# Patient Record
Sex: FEMALE | Race: WHITE | Employment: UNEMPLOYED | URBAN - METROPOLITAN AREA
[De-identification: names, ages, dates, MRNs, and addresses within clinical notes are randomized per-mention and may not be internally consistent; named-entity substitution may affect disease eponyms.]

---

## 2018-02-09 PROBLEM — F32.A DEPRESSION: Status: ACTIVE | Noted: 2018-02-09

## 2018-03-06 ENCOUNTER — HOSPITAL ENCOUNTER (OUTPATIENT)
Age: 38
Setting detail: SPECIMEN
Discharge: HOME | End: 2018-03-06
Payer: COMMERCIAL

## 2018-03-06 DIAGNOSIS — R51.9 NONINTRACTABLE EPISODIC HEADACHE, UNSPECIFIED HEADACHE TYPE: ICD-10-CM

## 2018-03-06 DIAGNOSIS — Z13.9 SCREENING FOR CONDITION: ICD-10-CM

## 2018-03-06 LAB
ALBUMIN SERPL-MCNC: 4.3 G/DL (ref 3.5–5.2)
ALBUMIN/GLOBULIN RATIO: 1.7 (ref 1–2.5)
ALP BLD-CCNC: 61 U/L (ref 35–104)
ALT SERPL-CCNC: 18 U/L (ref 5–33)
ANION GAP SERPL CALCULATED.3IONS-SCNC: 16 MMOL/L (ref 9–17)
AST SERPL-CCNC: 20 U/L
BILIRUB SERPL-MCNC: 0.15 MG/DL (ref 0.3–1.2)
BUN BLDV-MCNC: 10 MG/DL (ref 6–20)
BUN/CREAT BLD: ABNORMAL (ref 9–20)
CALCIUM SERPL-MCNC: 9.5 MG/DL (ref 8.6–10.4)
CHLORIDE BLD-SCNC: 103 MMOL/L (ref 98–107)
CHOLESTEROL/HDL RATIO: 3.3
CHOLESTEROL: 147 MG/DL
CO2: 23 MMOL/L (ref 20–31)
CREAT SERPL-MCNC: 0.49 MG/DL (ref 0.5–0.9)
GFR AFRICAN AMERICAN: >60 ML/MIN
GFR NON-AFRICAN AMERICAN: >60 ML/MIN
GFR SERPL CREATININE-BSD FRML MDRD: ABNORMAL ML/MIN/{1.73_M2}
GFR SERPL CREATININE-BSD FRML MDRD: ABNORMAL ML/MIN/{1.73_M2}
GLUCOSE BLD-MCNC: 87 MG/DL (ref 70–99)
HCT VFR BLD CALC: 38.9 % (ref 36.3–47.1)
HDLC SERPL-MCNC: 44 MG/DL
HEMOGLOBIN: 12.1 G/DL (ref 11.9–15.1)
IRON: 61 UG/DL (ref 37–145)
LDL CHOLESTEROL: 91 MG/DL (ref 0–130)
MCH RBC QN AUTO: 28.9 PG (ref 25.2–33.5)
MCHC RBC AUTO-ENTMCNC: 31.1 G/DL (ref 28.4–34.8)
MCV RBC AUTO: 93.1 FL (ref 82.6–102.9)
NRBC AUTOMATED: 0 PER 100 WBC
PDW BLD-RTO: 12.1 % (ref 11.8–14.4)
PLATELET # BLD: 291 K/UL (ref 138–453)
PMV BLD AUTO: 11 FL (ref 8.1–13.5)
POTASSIUM SERPL-SCNC: 4.2 MMOL/L (ref 3.7–5.3)
RBC # BLD: 4.18 M/UL (ref 3.95–5.11)
SODIUM BLD-SCNC: 142 MMOL/L (ref 135–144)
THYROXINE, FREE: 0.97 NG/DL (ref 0.93–1.7)
TOTAL PROTEIN: 6.9 G/DL (ref 6.4–8.3)
TRIGL SERPL-MCNC: 59 MG/DL
TSH SERPL DL<=0.05 MIU/L-ACNC: 0.83 MIU/L (ref 0.3–5)
VLDLC SERPL CALC-MCNC: NORMAL MG/DL (ref 1–30)
WBC # BLD: 7.9 K/UL (ref 3.5–11.3)

## 2018-03-20 ENCOUNTER — HOSPITAL ENCOUNTER (OUTPATIENT)
Dept: MRI IMAGING | Facility: CLINIC | Age: 38
Discharge: HOME | End: 2018-03-22
Payer: COMMERCIAL

## 2018-03-20 DIAGNOSIS — R51.9 ACUTE NONINTRACTABLE HEADACHE, UNSPECIFIED HEADACHE TYPE: ICD-10-CM

## 2018-03-20 DIAGNOSIS — R51.9 NONINTRACTABLE EPISODIC HEADACHE, UNSPECIFIED HEADACHE TYPE: ICD-10-CM

## 2018-03-20 PROCEDURE — 2580000003 HC RX 258: Performed by: NURSE PRACTITIONER

## 2018-03-20 PROCEDURE — 70553 MRI BRAIN STEM W/O & W/DYE: CPT

## 2018-03-20 PROCEDURE — 6360000004 HC RX CONTRAST MEDICATION: Performed by: NURSE PRACTITIONER

## 2018-03-20 PROCEDURE — 70544 MR ANGIOGRAPHY HEAD W/O DYE: CPT

## 2018-03-20 PROCEDURE — A9579 GAD-BASE MR CONTRAST NOS,1ML: HCPCS | Performed by: NURSE PRACTITIONER

## 2018-03-20 RX ORDER — SODIUM CHLORIDE 0.9 % (FLUSH) 0.9 %
10 SYRINGE (ML) INJECTION PRN
Status: COMPLETED | OUTPATIENT
Start: 2018-03-20 | End: 2018-03-20

## 2018-03-20 RX ADMIN — GADOTERIDOL 15 ML: 279.3 INJECTION, SOLUTION INTRAVENOUS at 09:52

## 2018-03-20 RX ADMIN — Medication 10 ML: at 09:30

## 2018-03-23 ENCOUNTER — HOSPITAL ENCOUNTER (OUTPATIENT)
Age: 38
Setting detail: SPECIMEN
Discharge: HOME | End: 2018-03-23
Payer: COMMERCIAL

## 2018-03-23 DIAGNOSIS — M25.50 ARTHRALGIA, UNSPECIFIED JOINT: ICD-10-CM

## 2018-03-23 DIAGNOSIS — R53.83 FATIGUE, UNSPECIFIED TYPE: ICD-10-CM

## 2018-03-23 DIAGNOSIS — R51.9 PRESSURE IN HEAD: ICD-10-CM

## 2018-03-23 DIAGNOSIS — E55.9 VITAMIN D DEFICIENCY: ICD-10-CM

## 2018-03-23 LAB
C-REACTIVE PROTEIN: 5.8 MG/L (ref 0–5)
LH: 7 U/L (ref 1–95.6)
RHEUMATOID FACTOR: <10 IU/ML
SEDIMENTATION RATE, ERYTHROCYTE: 29 MM (ref 0–20)
SEX HORMONE BINDING GLOBULIN: 75 NMOL/L (ref 30–135)
TESTOSTERONE FREE-NONMALE: 3.2 PG/ML (ref 1.3–9.2)
TESTOSTERONE TOTAL: 31 NG/DL (ref 20–70)
VITAMIN D 25-HYDROXY: 51.7 NG/ML (ref 30–100)

## 2018-03-26 LAB — ANTI-NUCLEAR ANTIBODY (ANA): NEGATIVE

## 2018-03-27 LAB — CCP IGG ANTIBODIES: <1.5 U/ML

## 2018-04-06 PROBLEM — R76.8 ELEVATED ANTINUCLEAR ANTIBODY (ANA) LEVEL: Status: ACTIVE | Noted: 2018-04-06

## 2018-04-06 PROBLEM — R79.82 ELEVATED C-REACTIVE PROTEIN (CRP): Status: ACTIVE | Noted: 2018-04-06

## 2018-09-24 ENCOUNTER — HOSPITAL ENCOUNTER (OUTPATIENT)
Age: 38
Setting detail: SPECIMEN
Discharge: HOME | End: 2018-09-24
Payer: COMMERCIAL

## 2018-09-24 ENCOUNTER — OFFICE VISIT (OUTPATIENT)
Dept: OBGYN CLINIC | Age: 38
End: 2018-09-24
Payer: COMMERCIAL

## 2018-09-24 DIAGNOSIS — Z01.419 PAP SMEAR, AS PART OF ROUTINE GYNECOLOGICAL EXAMINATION: ICD-10-CM

## 2018-09-24 DIAGNOSIS — Z11.51 SCREENING FOR HUMAN PAPILLOMAVIRUS: ICD-10-CM

## 2018-09-24 DIAGNOSIS — Z12.31 ENCOUNTER FOR SCREENING MAMMOGRAM FOR BREAST CANCER: Primary | ICD-10-CM

## 2018-09-24 PROCEDURE — G0145 SCR C/V CYTO,THINLAYER,RESCR: HCPCS

## 2018-09-24 PROCEDURE — 87624 HPV HI-RISK TYP POOLED RSLT: CPT

## 2018-09-24 PROCEDURE — 99386 PREV VISIT NEW AGE 40-64: CPT | Performed by: ADVANCED PRACTICE MIDWIFE

## 2018-09-24 ASSESSMENT — PATIENT HEALTH QUESTIONNAIRE - PHQ9
SUM OF ALL RESPONSES TO PHQ QUESTIONS 1-9: 0
SUM OF ALL RESPONSES TO PHQ QUESTIONS 1-9: 0
1. LITTLE INTEREST OR PLEASURE IN DOING THINGS: 0
2. FEELING DOWN, DEPRESSED OR HOPELESS: 0
SUM OF ALL RESPONSES TO PHQ9 QUESTIONS 1 & 2: 0

## 2018-09-24 NOTE — PROGRESS NOTES
History and Physical  [address], [address] [facility] [place] [address]. [phone]   Fax [phone]  Angélica Schmidt  9/24/2018              34 y.o. No chief complaint on file. No LMP recorded. Primary Care Physician: BRENDON Duvall - CNP    The patient was seen and examined. She has no chief complaint today and is here for her annual exam.  Her bowels are regular. There are no voiding complaints. She denies any bloating. She denies vaginal discharge and was counseled on STD's and the need for barrier contraception. HPI : Angélica Schmidt is a 34 y.o. female No obstetric history on file.     Gyn exam, no complaints  ________________________________________________________________________  Obstetric History     No data available        Past Medical History:   Diagnosis Date    Abnormal Pap smear of cervix     follows with Linda    Anxiety     Depression     teen    GERD (gastroesophageal reflux disease)                                                                    Past Surgical History:   Procedure Laterality Date    KNEE SURGERY Right     scope bursitis      Family History   Problem Relation Age of Onset    Arthritis Mother     Diabetes Mother     High Cholesterol Mother     Arthritis Father     Diabetes Father     High Cholesterol Father     Arthritis Sister     Arthritis Brother     Arthritis Maternal Aunt     Arthritis Maternal Uncle     Arthritis Paternal Aunt     Arthritis Paternal Uncle     Arthritis Maternal Grandmother     Asthma Maternal Grandmother     Diabetes Maternal Grandmother     Heart Disease Maternal Grandmother         CHF    High Blood Pressure Maternal Grandmother     Anemia Maternal Grandfather     Arthritis Maternal Grandfather     Colon Cancer Maternal Grandfather     Prostate Cancer Maternal Grandfather     Cancer Maternal Grandfather         skin    High Blood Pressure PHYSICAL Exam:     Constitutional:  There were no vitals filed for this visit. General Appearance: This  is a well Developed, well Nourished, well groomed female. Her BMI was reviewed. Nutritional decision making was discussed. Skin:  There was a Normal Inspection of the skin without rashes or lesions. There were no rashes. (Papular, Maculopapular, Hives, Pustular, Macular)     There were no lesions (Ulcers, Erythema, Abn. Appearing Nevi)            Lymphatic:  No Lymph Nodes were Palpable in the neck , axilla or groin. # Of Lymph Nodes; Location ; Character [Normal]  [Shotty] [Tender] [Enlarged]     Neck and EENT:  The neck was supple. There were no masses   The thyroid was not enlarged and had no masses. Perrla, EOMI B/L, TMI B/L No Abnormalities. Throat inspected-No exudates or Masses, Nares Patent No Masses        Respiratory: The lungs were auscultated and found to be clear. There were no rales, rhonchi or wheezes. There was a good respiratory effort. Cardiovascular: The heart was in a regular rate and rhythm. . No S3 or S4. There was no murmur appreciated. Location, grade, and radiation are not applicable. Extremities: The patients extremities were without calf tenderness, edema, or varicosities. There was full range of motion in all four extremities. Pulses in all four extremities were appreciated and are 2/4. Abdomen: The abdomen was soft and non-tender. There were good bowel sounds in all quadrants and there was no guarding, rebound or rigidity. On evaluation there was no evidence of hepatosplenomegaly and there was no costal vertebral taylor tenderness bilaterally. No hernias were appreciated. Abdominal Scars:     Psych:   The patient had a normal Orientation to: Time, Place, every 2-3 years. Begin at 73 yo. If no fracture history or osteoporosis family history. (significant). 4. Colonoscopy begin at 38 yo. Repeat every ten years if negative and no family history. 5. Calcium of 7607-1136 mg/day in split dosing  6. Vitamin D 400-800 IU/day  7. All other preventative health recommendations will be managed by the patients Primary care physician. PLAN:  Return for Annual.   Pt has history of Grandfather with colon cancer FIT test given  Mammogram ordered  PAP collected  Repeat Annual every 1 year  Cervical Cytology Evaluation begins at 24years old. If Negative Cytology, Follow-up screening per current guidelines. Mammograms every 1 year. If 35 yo and last mammogram was negative. Calcium and Vitamin D dosing reviewed. Colonoscopy screening reviewed as well as onset for bone density testing. Birth control and barrier recommendations discussed. STD counseling and prevention reviewed. Gardisil counseling completed for all patients 7-33 yo. Routine health maintenance per patients PCP.   Orders Placed This Encounter   Procedures    BULL DIGITAL DIAGNOSTIC W OR WO CAD BILATERAL     Standing Status:   Future     Standing Expiration Date:   11/24/2019

## 2018-09-25 DIAGNOSIS — Z12.31 SCREENING MAMMOGRAM, ENCOUNTER FOR: Primary | ICD-10-CM

## 2018-09-26 LAB
HPV SAMPLE: NORMAL
HPV SOURCE: NORMAL
HPV, GENOTYPE 16: NOT DETECTED
HPV, GENOTYPE 18: NOT DETECTED
HPV, HIGH RISK OTHER: NOT DETECTED
HPV, INTERPRETATION: NORMAL

## 2018-10-08 ENCOUNTER — HOSPITAL ENCOUNTER (OUTPATIENT)
Dept: WOMENS IMAGING | Age: 38
Discharge: HOME | End: 2018-10-10
Payer: COMMERCIAL

## 2018-10-08 DIAGNOSIS — Z12.31 ENCOUNTER FOR SCREENING MAMMOGRAM FOR BREAST CANCER: ICD-10-CM

## 2018-10-08 DIAGNOSIS — N64.4 BREAST PAIN: Primary | ICD-10-CM

## 2018-10-08 DIAGNOSIS — R52 PAIN: ICD-10-CM

## 2018-10-08 DIAGNOSIS — N64.4 BREAST PAIN: ICD-10-CM

## 2018-10-08 DIAGNOSIS — N64.4 BREAST TENDERNESS IN FEMALE: ICD-10-CM

## 2018-10-08 LAB — CYTOLOGY REPORT: NORMAL

## 2018-10-08 PROCEDURE — G0279 TOMOSYNTHESIS, MAMMO: HCPCS

## 2018-10-08 PROCEDURE — 76642 ULTRASOUND BREAST LIMITED: CPT

## 2019-10-17 ENCOUNTER — TELEPHONE (OUTPATIENT)
Dept: OBGYN CLINIC | Age: 39
End: 2019-10-17

## 2019-10-28 ENCOUNTER — HOSPITAL ENCOUNTER (OUTPATIENT)
Dept: WOMENS IMAGING | Age: 39
Discharge: HOME | End: 2019-10-30
Payer: COMMERCIAL

## 2019-10-28 DIAGNOSIS — Z12.31 BREAST CANCER SCREENING BY MAMMOGRAM: ICD-10-CM

## 2019-10-28 PROCEDURE — 77063 BREAST TOMOSYNTHESIS BI: CPT

## 2019-12-04 ENCOUNTER — HOSPITAL ENCOUNTER (OUTPATIENT)
Age: 39
Setting detail: SPECIMEN
Discharge: HOME | End: 2019-12-04
Payer: COMMERCIAL

## 2019-12-04 ENCOUNTER — OFFICE VISIT (OUTPATIENT)
Dept: OBGYN CLINIC | Age: 39
End: 2019-12-04
Payer: COMMERCIAL

## 2019-12-04 VITALS
HEART RATE: 67 BPM | HEIGHT: 62 IN | SYSTOLIC BLOOD PRESSURE: 118 MMHG | WEIGHT: 187 LBS | DIASTOLIC BLOOD PRESSURE: 80 MMHG | BODY MASS INDEX: 34.41 KG/M2

## 2019-12-04 DIAGNOSIS — Z01.419 WELL WOMAN EXAM WITH ROUTINE GYNECOLOGICAL EXAM: ICD-10-CM

## 2019-12-04 DIAGNOSIS — Z11.51 SCREENING FOR HPV (HUMAN PAPILLOMAVIRUS): ICD-10-CM

## 2019-12-04 DIAGNOSIS — Z01.419 WELL WOMAN EXAM WITH ROUTINE GYNECOLOGICAL EXAM: Primary | ICD-10-CM

## 2019-12-04 PROCEDURE — 87624 HPV HI-RISK TYP POOLED RSLT: CPT

## 2019-12-04 PROCEDURE — G0145 SCR C/V CYTO,THINLAYER,RESCR: HCPCS

## 2019-12-04 PROCEDURE — 99396 PREV VISIT EST AGE 40-64: CPT | Performed by: NURSE PRACTITIONER

## 2019-12-04 ASSESSMENT — PATIENT HEALTH QUESTIONNAIRE - PHQ9
SUM OF ALL RESPONSES TO PHQ9 QUESTIONS 1 & 2: 0
2. FEELING DOWN, DEPRESSED OR HOPELESS: 0
SUM OF ALL RESPONSES TO PHQ QUESTIONS 1-9: 0
SUM OF ALL RESPONSES TO PHQ QUESTIONS 1-9: 0
1. LITTLE INTEREST OR PLEASURE IN DOING THINGS: 0

## 2019-12-06 LAB
HPV SAMPLE: ABNORMAL
HPV, GENOTYPE 16: NOT DETECTED
HPV, GENOTYPE 18: NOT DETECTED
HPV, HIGH RISK OTHER: DETECTED
HPV, INTERPRETATION: ABNORMAL
SPECIMEN DESCRIPTION: ABNORMAL

## 2019-12-10 LAB — CYTOLOGY REPORT: NORMAL

## 2019-12-11 ENCOUNTER — TELEPHONE (OUTPATIENT)
Dept: OBGYN CLINIC | Age: 39
End: 2019-12-11

## 2020-02-04 PROBLEM — B97.7 HPV IN FEMALE: Status: ACTIVE | Noted: 2020-02-04

## 2020-02-26 ENCOUNTER — PROCEDURE VISIT (OUTPATIENT)
Dept: OBGYN CLINIC | Age: 40
End: 2020-02-26
Payer: COMMERCIAL

## 2020-02-26 ENCOUNTER — HOSPITAL ENCOUNTER (OUTPATIENT)
Age: 40
Setting detail: SPECIMEN
Discharge: HOME | End: 2020-02-26
Payer: COMMERCIAL

## 2020-02-26 VITALS
WEIGHT: 181 LBS | BODY MASS INDEX: 33.31 KG/M2 | SYSTOLIC BLOOD PRESSURE: 114 MMHG | HEIGHT: 62 IN | HEART RATE: 78 BPM | DIASTOLIC BLOOD PRESSURE: 80 MMHG

## 2020-02-26 LAB
CONTROL: NORMAL
PREGNANCY TEST URINE, POC: NEGATIVE

## 2020-02-26 PROCEDURE — 81025 URINE PREGNANCY TEST: CPT | Performed by: OBSTETRICS & GYNECOLOGY

## 2020-02-26 PROCEDURE — 88305 TISSUE EXAM BY PATHOLOGIST: CPT

## 2020-02-26 PROCEDURE — 57456 ENDOCERV CURETTAGE W/SCOPE: CPT | Performed by: OBSTETRICS & GYNECOLOGY

## 2020-02-26 NOTE — PROGRESS NOTES
[id number] [facility]    COLPOSCOPY PROCEDURE FORM    Chief Complaint:   Chief Complaint   Patient presents with    Procedure         2020  Paul Barber  Patient's last menstrual period was 2020 (approximate).   37 y.o.   Vag x 2    Past Medical History:   Diagnosis Date    Abnormal Pap smear of cervix     follows with Linda    Anxiety     Depression     teen    GERD (gastroesophageal reflux disease)          Past Surgical History:   Procedure Laterality Date    KNEE SURGERY Right     scope bursitis        Family History   Problem Relation Age of Onset    Arthritis Mother     Diabetes Mother     High Cholesterol Mother     Arthritis Father     Diabetes Father     High Cholesterol Father     Arthritis Sister     Arthritis Brother     Arthritis Maternal Aunt     Arthritis Maternal Uncle     Arthritis Paternal Aunt     Arthritis Paternal Uncle     Arthritis Maternal Grandmother     Asthma Maternal Grandmother     Diabetes Maternal Grandmother     Heart Disease Maternal Grandmother         CHF    High Blood Pressure Maternal Grandmother     Anemia Maternal Grandfather     Arthritis Maternal Grandfather     Colon Cancer Maternal Grandfather     Prostate Cancer Maternal Grandfather     Cancer Maternal Grandfather         skin    High Blood Pressure Maternal Grandfather     Arthritis Paternal Grandmother     Cancer Paternal Grandmother         stomach    Stroke Paternal Grandmother     Asthma Maternal Cousin        Social History     Socioeconomic History    Marital status:      Spouse name: Not on file    Number of children: Not on file    Years of education: Not on file    Highest education level: Not on file   Occupational History    Not on file   Social Needs    Financial resource strain: Not on file    Food insecurity:     Worry: Not on file     Inability: Not on file    Transportation needs:     Medical: Not on file Non-medical: Not on file   Tobacco Use    Smoking status: Never Smoker    Smokeless tobacco: Never Used   Substance and Sexual Activity    Alcohol use: Yes     Comment: occiasionally     Drug use: Not on file    Sexual activity: Not on file   Lifestyle    Physical activity:     Days per week: Not on file     Minutes per session: Not on file    Stress: Not on file   Relationships    Social connections:     Talks on phone: Not on file     Gets together: Not on file     Attends [employer] service: Not on file     Active member of club or organization: Not on file     Attends meetings of clubs or organizations: Not on file     Relationship status: Not on file    Intimate partner violence:     Fear of current or ex partner: Not on file     Emotionally abused: Not on file     Physically abused: Not on file     Forced sexual activity: Not on file   Other Topics Concern    Not on file   Social History Narrative    Not on file       Current Outpatient Medications on File Prior to Visit   Medication Sig Dispense Refill    ibuprofen (ADVIL;MOTRIN) 600 MG tablet TAKE ONE TABLET BY MOUTH EVERY 8 HOURS AS NEEDED FOR PAIN OR FEVER 30 tablet 0    Elastic Bandages & Supports (ACE TENNIS ELBOW BRACE) MISC 1 Device by Does not apply route continuous 1 each 0    triamcinolone (NASACORT) 55 MCG/ACT nasal inhaler 2 sprays by Nasal route daily 1 Inhaler 0    lansoprazole (PREVACID) 30 MG delayed release capsule Take 1 capsule by mouth daily 30 capsule 1    albuterol sulfate HFA (PROAIR HFA) 108 (90 Base) MCG/ACT inhaler Inhale 2 puffs into the lungs every 6 hours as needed for Wheezing 1 Inhaler 3    ondansetron (ZOFRAN) 4 MG tablet Take 1 tablet by mouth every 8 hours as needed for Nausea or Vomiting (Patient not taking: Reported on 2/26/2020) 20 tablet 0     No current facility-administered medications on file prior to visit.         Allergies as of 02/26/2020    (No Known Allergies)           INDICATIONS:   1. HPV in female    2. Negative pregnancy test    3. 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 Cervical stenosis (uterine cervix)      Cytology Report 12/04/2019  2:35  [address], [facility] [address]. [place], [address]   [phone]   Fax: [phone]   GYNECOLOGIC CYTOLOGY REPORT     Patient Name: Jacinta Landrum   MR#: [id number]   Specimen #[id number]   Source:   1: Cervical material, (ThinPrep vial, Imaging-assisted review)     Clinical History   Z01.419 Routine gyn exam without abnormal findings   Z11.51 Encounter for screening for HPV   Co-Test:  ThinPrep Pap with high risk HPV testing     INTERPRETATION     Cervical material, (ThinPrep vial, Imaging-assisted review):   Specimen Adequacy:        Satisfactory for evaluation.        -Endocervical/transformation zone component is absent. Descriptive Diagnosis:        Negative for intraepithelial lesion or malignancy.    Comments:        High Risk HPV testing was ordered. Cytotechnologist:   ALLAN Che JD(ASCP)   **Electronically Signed Out**   georgette/12/10/2019           Procedure/Addendum   HPV Procedure Report     Date Ordered:     12/6/2019     Status:   Signed Out        Date Complete:     12/6/2019     By: ALLAN Reynoso(ASCP)        Date Reported:     12/10/2019       INTERPRETATION   Roche HPV DNA High Risk                                                         HPV Sample               Thin Prep                    (Ref Range)   HPV Type 16               Not Detected                    (Not   Detected)   HPV Type 18               Not Detected                    (Not   Detected)   Other High Risk HPV     Detected                    (Not Detected)        This test amplifies and detects DNA of 14 high-risk HPV types   associated with cervical cancer and its precursor lesions (HPV types   16, 18, 31, 33, 35, 39, 45, 51, 52, 56, 58, 59, 66, and 76).   Sensitivity may be affected by specimen collection methods, stage of   infection, and the presence of interfering substances.  Results should   be interpreted in conjunction with other available laboratory and   clinical data.  A negative high-risk HPV result does not exclude the   possibility of future cytologic HSIL or underlying CIN2-3 or cancer. This test is intended for medical purposes only and is not valid for   the evaluation of suspected sexual abuse or for other forensic   purposes. [facility] negative         HPV:   +other      Birth Control Method: TL  Abnormal Cytology and Colposcopy History: YES ? Colposcopy hx NO LEEP or CKC    COLPOSCOPIC EXAMINATION:                Blood pressure 114/80, pulse 78, height 5' 2\" (1.575 m), weight 181 lb (82.1 kg), last menstrual period 02/04/2020, not currently breastfeeding. Gross observations: negative  Satisfactory: No   Unsatisfactory: Yes                LANDMARKS and ATYPICAL FINDINGS:  TZ = transformation zone   SC = new squamocolumnar junction  NC = Nabothian cyst    ME = immature squamous metaplasia  PO = polyp   AV = atypical vessels  C = condyloma  L = leukoplakia  AW = acetowhite epithelium   P = punctation  MO = mosaicism   LS = decreased Lugols uptake  X = biopsy sites  CA = invasive carcinoma      FINDINGS: The colposcope was utilized on high and low magnification. A plain white light and green filter were  also implemented after 3% of acetic acid was applied to the cervix. There was no Aceto White Epithelium, Mosaic Changes, Punctation, or Irregular Vessels seen. ECC performed:  Yes    Lugols Iodine Applied:   No       IMPRESSIONS: Negative  Biopsy sites: ECC      Assessment:   Diagnosis Orders   1. HPV in female  [place] COLPOSCOPY,CERVIX W/ADJ VAGINA, CURETTAG    Specimen to Pathology Outpatient   2. Negative pregnancy test  POCT urine pregnancy   3.  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 Cervical stenosis (uterine cervix)

## 2020-02-28 LAB — SURGICAL PATHOLOGY REPORT: NORMAL

## 2020-10-06 ENCOUNTER — OFFICE VISIT (OUTPATIENT)
Dept: OBGYN CLINIC | Age: 40
End: 2020-10-06
Payer: COMMERCIAL

## 2020-10-06 ENCOUNTER — HOSPITAL ENCOUNTER (OUTPATIENT)
Age: 40
Setting detail: SPECIMEN
Discharge: HOME | End: 2020-10-06
Payer: COMMERCIAL

## 2020-10-06 VITALS
TEMPERATURE: 98.2 F | WEIGHT: 181 LBS | DIASTOLIC BLOOD PRESSURE: 70 MMHG | SYSTOLIC BLOOD PRESSURE: 114 MMHG | HEART RATE: 78 BPM | BODY MASS INDEX: 33.31 KG/M2 | HEIGHT: 62 IN

## 2020-10-06 PROCEDURE — G8484 FLU IMMUNIZE NO ADMIN: HCPCS | Performed by: OBSTETRICS & GYNECOLOGY

## 2020-10-06 PROCEDURE — G8417 CALC BMI ABV UP PARAM F/U: HCPCS | Performed by: OBSTETRICS & GYNECOLOGY

## 2020-10-06 PROCEDURE — 99213 OFFICE O/P EST LOW 20 MIN: CPT | Performed by: OBSTETRICS & GYNECOLOGY

## 2020-10-06 PROCEDURE — G8427 DOCREV CUR MEDS BY ELIG CLIN: HCPCS | Performed by: OBSTETRICS & GYNECOLOGY

## 2020-10-06 PROCEDURE — 88175 CYTOPATH C/V AUTO FLUID REDO: CPT

## 2020-10-06 PROCEDURE — 1036F TOBACCO NON-USER: CPT | Performed by: OBSTETRICS & GYNECOLOGY

## 2020-10-06 PROCEDURE — 87624 HPV HI-RISK TYP POOLED RSLT: CPT

## 2020-10-06 NOTE — PROGRESS NOTES
Jayesh Hilton  10/6/2020      Jayesh Hilton is a 41 y.o. female No obstetric history on file. The patient was seen today. She was here to follow-up regarding her labs and diagnostics ordered at her last visit for the diagnosis of:    ICD-10-CM    1. 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 Her bowels are regular and she is voiding without difficulty.        Past Medical History:   Diagnosis Date    Abnormal Pap smear of cervix     follows with Linda    Anxiety     Depression     teen    GERD (gastroesophageal reflux disease)          Past Surgical History:   Procedure Laterality Date    KNEE SURGERY Right     scope bursitis          Family History   Problem Relation Age of Onset    Arthritis Mother     Diabetes Mother     High Cholesterol Mother     Arthritis Father     Diabetes Father     High Cholesterol Father     Arthritis Sister     Arthritis Brother     Arthritis Maternal Aunt     Arthritis Maternal Uncle     Arthritis Paternal Aunt     Arthritis Paternal Uncle     Arthritis Maternal Grandmother     Asthma Maternal Grandmother     Diabetes Maternal Grandmother     Heart Disease Maternal Grandmother         CHF    High Blood Pressure Maternal Grandmother     Anemia Maternal Grandfather     Arthritis Maternal Grandfather     Colon Cancer Maternal Grandfather     Prostate Cancer Maternal Grandfather     Cancer Maternal Grandfather         skin    High Blood Pressure Maternal Grandfather     Arthritis Paternal Grandmother     Cancer Paternal Grandmother         stomach    Stroke Paternal Grandmother     Asthma Maternal Cousin          Social History     Tobacco Use    Smoking status: Never Smoker    Smokeless tobacco: Never Used   Substance Use Topics    Alcohol use: Yes     Comment: occiasionally     Drug use: Not on file         MEDICATIONS:  Current Outpatient Medications   Medication Sig Dispense Refill    ibuprofen (ADVIL;MOTRIN) 600 MG tablet TAKE ONE TABLET BY MOUTH EVERY 8 HOURS AS NEEDED FOR PAIN OR FEVER 30 tablet 0    Elastic Bandages & Supports (ACE TENNIS ELBOW BRACE) MISC 1 Device by Does not apply route continuous 1 each 0    triamcinolone (NASACORT) 55 MCG/ACT nasal inhaler 2 sprays by Nasal route daily 1 Inhaler 0    lansoprazole (PREVACID) 30 MG delayed release capsule Take 1 capsule by mouth daily 30 capsule 1    albuterol sulfate HFA (PROAIR HFA) 108 (90 Base) MCG/ACT inhaler Inhale 2 puffs into the lungs every 6 hours as needed for Wheezing 1 Inhaler 3     No current facility-administered medications for this visit. ALLERGIES:  Allergies as of 10/06/2020    (No Known Allergies)         Blood pressure 114/70, pulse 78, temperature 98.2 °F (36.8 °C), height 5' 2\" (1.575 m), weight 181 lb (82.1 kg), last menstrual period 09/05/2020, not currently breastfeeding. Abdomen: Soft non-tender; good bowel sounds. No guarding, rebound or rigidity. No CVA tenderness bilaterally. Extremities: No calf tenderness, DTR 2/4, and No edema bilaterally    Pelvic: Declined         Diagnostics:  No results found. Lab Results:  Results for orders placed or performed during the hospital encounter of 02/26/20   Surgical Pathology   Result Value Ref Range    Surgical Pathology Report       [address]  [phone]  Fax: [phone]  SURGICAL PATHOLOGY REPORT    Patient Name: Christopher Penny  MR#: [id number]  Specimen #[id number]       Final Diagnosis  Endocervix, curettings:       - Benign ectocervical mucosa with no evidence of dysplasia. Deon Hernandez M.D.  **Electronically Signed Out**         [facility]/2/28/2020    Clinical Information  Positive HPV    Source:  A: Endocervical bx (\"ECC\" on container)    Gross Description  The container is labeled \"ECC\".   Received in formalin are multiple  tan-brown fragments of tissue measuring 0.8 cm in greatest aggregate  dimension. The specimen is entirely submitted in one cassette. Microscopic Description  Two H&E slides reviewed. Microscopic examination performed and  supports the diagnostic impression. Assessment:   Diagnosis Orders   1. 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  Pap collected. If negative fu with annual exams and follow ASCCP recommendations  Return to the office in [facility]  STD counseling completed. Counseled on preventative health maintenance follow-up. Orders Placed This Encounter   Procedures    PAP SMEAR     Patient History:    Patient's last menstrual period was 09/05/2020. OBGYN Status: Having periods  Past Surgical History:  No date: KNEE SURGERY; Right      Comment:  scope bursitis     Problem List       Edg Problems Affecting Cytology    HPV in female    Overview Addendum 10/6/2020  3:00 PM by Carlie Henriquez DO     12/4/19 pap collected- negative with positive HR HPV OTHER (Negative   16,18)  2/26/2020 colposcopy with ECC performed (ECC is negative)  8/2020 collect pap-NS/Reschedule  10/6/2020 pap collected with co-test        Social History    Tobacco Use      Smoking status: Never Smoker      Smokeless tobacco: Never Used       Standing Status:   Future     Standing Expiration Date:   10/6/2021     Order Specific Question:   Collection Type     Answer:    Thin Prep     Order Specific Question:   Prior Abnormal Pap Test     Answer:   No     Order Specific Question:   Screening or Diagnostic     Answer:   Diagnostic     Order Specific Question:   HPV Requested? Answer:   Yes     Order Specific Question:   High Risk Patient     Answer:   N/A       Patient was seen with total face to face time of 15 minutes. More than 50% of this visit was counseling and education regarding The encounter diagnosis was HPV in female (OTHER). and Follow-up   as well as  counseling on preventative health maintenance follow-up.

## 2020-10-13 LAB
HPV SOURCE: NORMAL
HPV, GENOTYPE 16: NOT DETECTED
HPV, GENOTYPE 18: NOT DETECTED
HPV, HIGH RISK OTHER: NOT DETECTED

## 2020-10-14 LAB — CYTOLOGY REPORT: NORMAL

## 2020-11-11 ENCOUNTER — HOSPITAL ENCOUNTER (OUTPATIENT)
Age: 40
Setting detail: SPECIMEN
Discharge: HOME | End: 2020-11-11
Payer: COMMERCIAL

## 2020-11-11 ENCOUNTER — NURSE ONLY (OUTPATIENT)
Dept: PRIMARY CARE CLINIC | Age: 40
End: 2020-11-11

## 2020-11-16 LAB — SARS-COV-2, NAA: NOT DETECTED

## 2020-12-03 ENCOUNTER — HOSPITAL ENCOUNTER (OUTPATIENT)
Dept: WOMENS IMAGING | Age: 40
Discharge: HOME | End: 2020-12-05
Payer: COMMERCIAL

## 2020-12-03 PROCEDURE — 77063 BREAST TOMOSYNTHESIS BI: CPT

## 2020-12-14 ENCOUNTER — HOSPITAL ENCOUNTER (OUTPATIENT)
Age: 40
Discharge: HOME | End: 2020-12-14
Payer: COMMERCIAL

## 2020-12-14 PROCEDURE — 83014 H PYLORI DRUG ADMIN: CPT

## 2020-12-14 PROCEDURE — 83013 H PYLORI (C-13) BREATH: CPT

## 2020-12-16 LAB — H PYLORI BREATH TEST: NEGATIVE

## 2021-01-08 ENCOUNTER — TELEPHONE (OUTPATIENT)
Dept: GASTROENTEROLOGY | Age: 41
End: 2021-01-08

## 2021-01-08 NOTE — TELEPHONE ENCOUNTER
Patient called office to see if we had any sooner appointments at this time. Advised after checking all locations that we do not. Updating cancellation list and patient can check back as well. Writer thanked and call ended.

## 2021-01-12 ENCOUNTER — TELEPHONE (OUTPATIENT)
Dept: GASTROENTEROLOGY | Age: 41
End: 2021-01-12

## 2021-01-12 NOTE — TELEPHONE ENCOUNTER
Spoke with pt again and she still wants to wait for Dr Nanette Campoverde. Writer made sure she is on wait list and if possible will get her in next week.

## 2021-01-12 NOTE — TELEPHONE ENCOUNTER
Pt has np set for week of the 25  , but her symptoms are getting worse. Unable to keep any food down from lunch on. Pt does try to eat at nite and drink water and everything comes back up. Pt has been experiencing  Lower back pain. Pt has lost a total of 11 lbs in the last 1-2 weeks. Pt does not want to reschedule with any other dr, but wonders if there is anything you can recommend she do until her appt.    Pt would like to speak with nurse

## 2021-01-12 NOTE — TELEPHONE ENCOUNTER
As this is a new patient, she will need to follow up with her PCP until she is seen in the office for any questions or concerns. Pt can schedule with another GI in office if she is able to get sooner appointment and/or also, please add pt to the cancellation list.  Nurse unable to give medical advise to a patient who has not been seen in our practice. Thank you.

## 2021-01-21 ENCOUNTER — OFFICE VISIT (OUTPATIENT)
Dept: GASTROENTEROLOGY | Age: 41
End: 2021-01-21
Payer: COMMERCIAL

## 2021-01-21 VITALS — SYSTOLIC BLOOD PRESSURE: 129 MMHG | DIASTOLIC BLOOD PRESSURE: 87 MMHG | WEIGHT: 176.9 LBS | BODY MASS INDEX: 32.36 KG/M2

## 2021-01-21 DIAGNOSIS — R11.14 BILIOUS VOMITING WITH NAUSEA: Primary | ICD-10-CM

## 2021-01-21 DIAGNOSIS — K21.9 GASTROESOPHAGEAL REFLUX DISEASE, UNSPECIFIED WHETHER ESOPHAGITIS PRESENT: ICD-10-CM

## 2021-01-21 DIAGNOSIS — Z80.0 FAMILY HISTORY OF COLON CANCER: ICD-10-CM

## 2021-01-21 DIAGNOSIS — R63.4 WEIGHT LOSS: ICD-10-CM

## 2021-01-21 DIAGNOSIS — R13.19 ESOPHAGEAL DYSPHAGIA: ICD-10-CM

## 2021-01-21 PROCEDURE — 99204 OFFICE O/P NEW MOD 45 MIN: CPT | Performed by: INTERNAL MEDICINE

## 2021-01-21 RX ORDER — SUCRALFATE 1 G/1
1 TABLET ORAL 4 TIMES DAILY
COMMUNITY
Start: 2021-01-14 | End: 2021-01-29

## 2021-01-21 RX ORDER — POLYETHYLENE GLYCOL 3350 17 G/17G
POWDER, FOR SOLUTION ORAL
Qty: 238 G | Refills: 0 | Status: SHIPPED | OUTPATIENT
Start: 2021-01-21 | End: 2021-02-18

## 2021-01-21 ASSESSMENT — ENCOUNTER SYMPTOMS
SORE THROAT: 0
RECTAL PAIN: 0
CONSTIPATION: 0
VOMITING: 1
ABDOMINAL PAIN: 1
TROUBLE SWALLOWING: 1
COUGH: 0
DIARRHEA: 0
NAUSEA: 1
ABDOMINAL DISTENTION: 1
BLOOD IN STOOL: 0
VOICE CHANGE: 1
CHOKING: 1
ANAL BLEEDING: 0

## 2021-01-21 NOTE — PROGRESS NOTES
GI NEW PATIENT OFFICE VISIT    INTERVAL HISTORY:   Keven Juarez, APRN - CNP  [facility]  [address] [facility],[address]  [place],  [address] [place] [address]    Chief Complaint   Patient presents with    Emesis     NP with every meal, she states hx of GERD, has lost 15lbs in 9 days    Weight Loss       1. Bilious vomiting with nausea    2. Family history of colon cancer    3. Gastroesophageal reflux disease, unspecified whether esophagitis present    4. Weight loss    5. Esophageal dysphagia        This patient is evaluated in my office for the first time  She was accompanied by her  during the interview  Patient has history significant for chronic acid reflux indigestion    She has been on proton pump inhibitor therapy lansoprazole for a long time  It appears that this medicine stopped working and she thinks that she got immune to that? Subsequently she has been on Pepcid for little bit    For the past 1 year with gradual worsening she is having some vomiting/regurgitation of the food with occasional nausea    She also has lost approximately 15 pounds she claims    She has some trouble swallowing food both with liquids and solids she claims    Denies any hematemesis coffee-ground emesis    Denies any rectal bleeding melanotic stools she has significant family stay for colon cancer      Never had GI work-up done in the past    Patient does have significant anxiety issues she claims    Denies smoking alcohol abuse illicit drug usage    HISTORY OF PRESENT ILLNESS: George Rubi is a 41 y.o. female with a past history remarkable for , referred for evaluation of   Chief Complaint   Patient presents with    Emesis     NP with every meal, she states hx of GERD, has lost 15lbs in 9 days    Weight Loss   . Past Medical,Family, and Social History reviewed and does contribute to the patient presenting condition.     Patient's PMH/PSH,SH,PSYCH Hx, MEDs, ALLERGIES, and ROS were all reviewed and updated in the appropriate sections.     PAST MEDICAL HISTORY:  Past Medical History:   Diagnosis Date    Abnormal Pap smear of cervix     follows with Linda    Anxiety     Depression     teen    GERD (gastroesophageal reflux disease)     Nausea and vomiting        Past Surgical History:   Procedure Laterality Date    KNEE SURGERY Right     scope bursitis        CURRENT MEDICATIONS:    Current Outpatient Medications:     sucralfate (CARAFATE) 1 GM tablet, Take 1 g by mouth 4 times daily, Disp: , Rfl:     famotidine (PEPCID) 20 MG tablet, Take 1 tablet by mouth 2 times daily, Disp: 60 tablet, Rfl: 3    cetirizine (ZYRTEC) 10 MG tablet, TAKE ONE TABLET BY MOUTH DAILY, Disp: 30 tablet, Rfl: 5    ibuprofen (ADVIL;MOTRIN) 600 MG tablet, TAKE ONE TABLET BY MOUTH EVERY 8 HOURS AS NEEDED FOR PAIN OR FEVER, Disp: 30 tablet, Rfl: 0    Elastic Bandages & Supports (ACE TENNIS ELBOW BRACE) MISC, 1 Device by Does not apply route continuous, Disp: 1 each, Rfl: 0    triamcinolone (NASACORT) 55 MCG/ACT nasal inhaler, 2 sprays by Nasal route daily, Disp: 1 Inhaler, Rfl: 0    albuterol sulfate HFA (PROAIR HFA) 108 (90 Base) MCG/ACT inhaler, Inhale 2 puffs into the lungs every 6 hours as needed for Wheezing, Disp: 1 Inhaler, Rfl: 3    ALLERGIES:   No Known Allergies    FAMILY HISTORY:       Problem Relation Age of Onset    Arthritis Mother     Diabetes Mother     High Cholesterol Mother     Arthritis Father     Diabetes Father     High Cholesterol Father     Arthritis Sister     Arthritis Brother     Arthritis Maternal Aunt     Arthritis Maternal Uncle     Arthritis Paternal Aunt     Arthritis Paternal Uncle     Arthritis Maternal Grandmother     Asthma Maternal Grandmother     Diabetes Maternal Grandmother     Heart Disease Maternal Grandmother         CHF    High Blood Pressure Maternal Grandmother     Anemia Maternal Grandfather     Arthritis Maternal Grandfather     Colon Cancer Maternal Grandfather     Prostate Cancer Maternal Grandfather     Cancer Maternal Grandfather         skin    High Blood Pressure Maternal Grandfather     Arthritis Paternal Grandmother     Cancer Paternal Grandmother         stomach    Stroke Paternal Grandmother     Asthma Maternal Cousin          SOCIAL HISTORY:   Social History     Socioeconomic History    Marital status:      Spouse name: Not on file    Number of children: Not on file    Years of education: Not on file    Highest education level: Not on file   Occupational History    Not on file   Social Needs    Financial resource strain: Not on file    Food insecurity     Worry: Not on file     Inability: Not on file    Transportation needs     Medical: Not on file     Non-medical: Not on file   Tobacco Use    Smoking status: Never Smoker    Smokeless tobacco: Never Used   Substance and Sexual Activity    Alcohol use: Yes     Comment: occiasionally     Drug use: Not on file    Sexual activity: Not on file   Lifestyle    Physical activity     Days per week: Not on file     Minutes per session: Not on file    Stress: Not on file   Relationships    Social connections     Talks on phone: Not on file     Gets together: Not on file     Attends Judaism service: Not on file     Active member of club or organization: Not on file     Attends meetings of clubs or organizations: Not on file     Relationship status: Not on file    Intimate partner violence     Fear of current or ex partner: Not on file     Emotionally abused: Not on file     Physically abused: Not on file     Forced sexual activity: Not on file   Other Topics Concern    Not on file   Social History Narrative    Not on file         REVIEW OF SYSTEMS:         Review of Systems   HENT: Positive for trouble swallowing and voice change. Negative for sore throat. Respiratory: Positive for choking. Negative for cough. Cardiovascular: Positive for chest pain (tightness). Gastrointestinal: Positive for abdominal distention (off and on), abdominal pain, nausea and vomiting (with every meal). Negative for anal bleeding, blood in stool, constipation, diarrhea and rectal pain. GERD       PHYSICAL EXAMINATION: Vital signs reviewed per the nursing documentation. /87   Wt 176 lb 14.4 oz (80.2 kg)   BMI 32.36 kg/m²   Body mass index is 32.36 kg/m². Physical Exam  Nursing note reviewed. Constitutional:       Appearance: She is well-developed. Comments: Anxious    HENT:      Head: Normocephalic and atraumatic. Eyes:      Conjunctiva/sclera: Conjunctivae normal.      Pupils: Pupils are equal, round, and reactive to light. Neck:      Musculoskeletal: Normal range of motion and neck supple. Cardiovascular:      Heart sounds: Normal heart sounds. Pulmonary:      Effort: Pulmonary effort is normal.      Breath sounds: Normal breath sounds. Abdominal:      General: Bowel sounds are normal.      Palpations: Abdomen is soft. Comments: NON TENDER, NON DISTENTED  LIVER SPLEEN AND HERNIAS ARE NOT  PALPABLE  BOWEL SOUNDS ARE POSITIVE      Musculoskeletal: Normal range of motion. Skin:     General: Skin is warm. Neurological:      Mental Status: She is alert and oriented to person, place, and time.    Psychiatric:         Behavior: Behavior normal.           LABORATORY DATA: Reviewed  Lab Results   Component Value Date    WBC 7.9 03/06/2018    HGB 12.1 03/06/2018    HCT 38.9 03/06/2018    MCV 93.1 03/06/2018     03/06/2018     03/06/2018    K 4.2 03/06/2018     03/06/2018    CO2 23 03/06/2018    BUN 10 03/06/2018    CREATININE 0.49 (L) 03/06/2018    LABALBU 4.3 03/06/2018    BILITOT 0.15 (L) 03/06/2018    ALKPHOS 61 03/06/2018    AST 20 03/06/2018    ALT 18 03/06/2018         Lab Results   Component Value Date    RBC 4.18 03/06/2018    HGB 12.1 03/06/2018    MCV 93.1 03/06/2018    MCH 28.9 03/06/2018    [facility] 31.1 03/06/2018    RDW 12.1 03/06/2018    MPV 11.0 03/06/2018         DIAGNOSTIC TESTING:     No results found. Assessment  1. Bilious vomiting with nausea    2. Family history of colon cancer    3. Gastroesophageal reflux disease, unspecified whether esophagitis present    4. Weight loss    5. Esophageal dysphagia        Plan    I will plan upper endoscopy in her to evaluate her symptoms    Also scheduled for colonoscopy at the same time secondary significant family history and some weight loss    The Endoscopic procedure was explained to the patient in detail  The prep and NPO were explained  All the Risks, Benefits, and Alternatives were explained  Risk of Bleeding, Perforation and Cardio Respiratory risks were explained  her questions were answered  The procedure has been scheduled with the  in the office  Patient was asked to give us a call for any questions  The patient has verbalized understanding and agreement to this plan. Pt was asked to chew food well  Take time in eating  Sit up or prop up when eating  Don't talk when eating  Walk after finish eating  Use liquids with meals if has issues  The patient has verbalized understanding and agreemenet to this. Pt was discussed in detail about the possible side effects of proton pump inhibiter therapy. She was explained about the possibility of calcium and magnesium malabsorption and was advised to start taking calcium supplements with Vit D. Some over the counter regimens were explained to patient. Some dietary advices were also given. She has verbalized understanding and agreement to this. Pt seems to have signs and symptoms consistent with GERD, acid indigestion and heartburns. She was discussed  in detail about some possible life style and dietary modifications. She was stressed about the maintenance  of appropriate weight and effect of obesity contributing to reflux symptoms.  Routine exercise was streesed. Avoidance of Caffeine, nicotine and chocolate were explained. Pt was asked to avoid spices grease and fried food. Advices were also given about avoidance of any kind of fast foods, soda pops and high energy drinks. Pt was advised to place two small block under the head end of the bed which may help with night time reflux. Was advised not to eat any thin at least 2-3 hrs before going to bed and walk especially after dinner    Pt has verbalized understanding and agreement to this plan. Pt was advised in detail about some life style and dietary modifications. She was advised about avoidance of caffeine, nicotine and chocolate. Pt was also told to stay away from any kind of fast foods, soda pops. She was also advised to avoid lots of spices, grease and fried food etc.     Instructions were also given about trying to arrange the timing, quality and quantity of food. Instructions were given about using ample amount of fiber including dietary and supplemental fiber either metamucil, bennafiber or citrucell etc.  Pt was advised about drinking ample amount of water without any colors or chemicals. Stress was given about regular exercise. Pt has verbalized understanding and agreement to these modifications. Depending on findings of the above testing future manual be decided    Their questions were answered    Thank you for allowing me to participate in the care of Ms. Pena. For any further questions please do not hesitate to contact me. I have reviewed and agree with the ROS entered by the MA/Nurse.          Michelle Dickinson MD, [facility]  Board Certified in Gastroenterology and [address] [place] [address] Gastroenterology  Office #: [phone]

## 2021-02-01 ENCOUNTER — HOSPITAL ENCOUNTER (OUTPATIENT)
Dept: LAB | Age: 41
Setting detail: SPECIMEN
Discharge: HOME | End: 2021-02-01
Payer: COMMERCIAL

## 2021-02-01 DIAGNOSIS — Z01.818 PREOP TESTING: Primary | ICD-10-CM

## 2021-02-01 PROCEDURE — U0005 INFEC AGEN DETEC AMPLI PROBE: HCPCS

## 2021-02-01 PROCEDURE — U0003 INFECTIOUS AGENT DETECTION BY NUCLEIC ACID (DNA OR RNA); SEVERE ACUTE RESPIRATORY SYNDROME CORONAVIRUS 2 (SARS-COV-2) (CORONAVIRUS DISEASE [COVID-19]), AMPLIFIED PROBE TECHNIQUE, MAKING USE OF HIGH THROUGHPUT TECHNOLOGIES AS DESCRIBED BY CMS-2020-01-R: HCPCS

## 2021-02-03 LAB
SARS-COV-2, RAPID: NORMAL
SARS-COV-2: NORMAL
SARS-COV-2: NOT DETECTED
SOURCE: NORMAL

## 2021-02-04 ENCOUNTER — ANESTHESIA EVENT (OUTPATIENT)
Dept: ENDOSCOPY | Age: 41
End: 2021-02-04
Payer: COMMERCIAL

## 2021-02-05 ENCOUNTER — HOSPITAL ENCOUNTER (OUTPATIENT)
Age: 41
Setting detail: OUTPATIENT SURGERY
Discharge: HOME | End: 2021-02-05
Attending: INTERNAL MEDICINE | Admitting: INTERNAL MEDICINE
Payer: COMMERCIAL

## 2021-02-05 ENCOUNTER — ANESTHESIA (OUTPATIENT)
Dept: ENDOSCOPY | Age: 41
End: 2021-02-05
Payer: COMMERCIAL

## 2021-02-05 VITALS
DIASTOLIC BLOOD PRESSURE: 78 MMHG | RESPIRATION RATE: 16 BRPM | SYSTOLIC BLOOD PRESSURE: 105 MMHG | OXYGEN SATURATION: 97 % | BODY MASS INDEX: 31.28 KG/M2 | HEIGHT: 62 IN | WEIGHT: 170 LBS | HEART RATE: 87 BPM | TEMPERATURE: 97.1 F

## 2021-02-05 VITALS — SYSTOLIC BLOOD PRESSURE: 109 MMHG | DIASTOLIC BLOOD PRESSURE: 66 MMHG | OXYGEN SATURATION: 95 %

## 2021-02-05 LAB
-: NORMAL
HCG, PREGNANCY URINE (POC): NEGATIVE

## 2021-02-05 PROCEDURE — 43239 EGD BIOPSY SINGLE/MULTIPLE: CPT | Performed by: INTERNAL MEDICINE

## 2021-02-05 PROCEDURE — 2709999900 HC NON-CHARGEABLE SUPPLY: Performed by: INTERNAL MEDICINE

## 2021-02-05 PROCEDURE — 3700000001 HC ADD 15 MINUTES (ANESTHESIA): Performed by: INTERNAL MEDICINE

## 2021-02-05 PROCEDURE — 7100000011 HC PHASE II RECOVERY - ADDTL 15 MIN: Performed by: INTERNAL MEDICINE

## 2021-02-05 PROCEDURE — 81025 URINE PREGNANCY TEST: CPT

## 2021-02-05 PROCEDURE — 7100000030 HC ASPR PHASE II RECOVERY - FIRST 15 MIN: Performed by: INTERNAL MEDICINE

## 2021-02-05 PROCEDURE — 3609012400 HC EGD TRANSORAL BIOPSY SINGLE/MULTIPLE: Performed by: INTERNAL MEDICINE

## 2021-02-05 PROCEDURE — 6360000002 HC RX W HCPCS: Performed by: NURSE ANESTHETIST, CERTIFIED REGISTERED

## 2021-02-05 PROCEDURE — 7100000000 HC PACU RECOVERY - FIRST 15 MIN: Performed by: INTERNAL MEDICINE

## 2021-02-05 PROCEDURE — 7100000010 HC PHASE II RECOVERY - FIRST 15 MIN: Performed by: INTERNAL MEDICINE

## 2021-02-05 PROCEDURE — 7100000001 HC PACU RECOVERY - ADDTL 15 MIN: Performed by: INTERNAL MEDICINE

## 2021-02-05 PROCEDURE — 2580000003 HC RX 258: Performed by: ANESTHESIOLOGY

## 2021-02-05 PROCEDURE — 45378 DIAGNOSTIC COLONOSCOPY: CPT | Performed by: INTERNAL MEDICINE

## 2021-02-05 PROCEDURE — 3609027000 HC COLONOSCOPY: Performed by: INTERNAL MEDICINE

## 2021-02-05 PROCEDURE — 88305 TISSUE EXAM BY PATHOLOGIST: CPT

## 2021-02-05 PROCEDURE — 3700000000 HC ANESTHESIA ATTENDED CARE: Performed by: INTERNAL MEDICINE

## 2021-02-05 PROCEDURE — 7100000031 HC ASPR PHASE II RECOVERY - ADDTL 15 MIN: Performed by: INTERNAL MEDICINE

## 2021-02-05 RX ORDER — PROPOFOL 10 MG/ML
INJECTION, EMULSION INTRAVENOUS CONTINUOUS PRN
Status: DISCONTINUED | OUTPATIENT
Start: 2021-02-05 | End: 2021-02-05 | Stop reason: SDUPTHER

## 2021-02-05 RX ORDER — SODIUM CHLORIDE, SODIUM LACTATE, POTASSIUM CHLORIDE, CALCIUM CHLORIDE 600; 310; 30; 20 MG/100ML; MG/100ML; MG/100ML; MG/100ML
INJECTION, SOLUTION INTRAVENOUS CONTINUOUS
Status: DISCONTINUED | OUTPATIENT
Start: 2021-02-05 | End: 2021-02-05 | Stop reason: HOSPADM

## 2021-02-05 RX ORDER — PROMETHAZINE HYDROCHLORIDE 25 MG/ML
6.25 INJECTION, SOLUTION INTRAMUSCULAR; INTRAVENOUS
Status: DISCONTINUED | OUTPATIENT
Start: 2021-02-05 | End: 2021-02-05 | Stop reason: HOSPADM

## 2021-02-05 RX ORDER — SODIUM CHLORIDE 0.9 % (FLUSH) 0.9 %
10 SYRINGE (ML) INJECTION EVERY 12 HOURS SCHEDULED
Status: DISCONTINUED | OUTPATIENT
Start: 2021-02-05 | End: 2021-02-05 | Stop reason: HOSPADM

## 2021-02-05 RX ORDER — MEPERIDINE HYDROCHLORIDE 25 MG/ML
12.5 INJECTION INTRAMUSCULAR; INTRAVENOUS; SUBCUTANEOUS EVERY 5 MIN PRN
Status: DISCONTINUED | OUTPATIENT
Start: 2021-02-05 | End: 2021-02-05 | Stop reason: HOSPADM

## 2021-02-05 RX ORDER — PROPOFOL 10 MG/ML
INJECTION, EMULSION INTRAVENOUS PRN
Status: DISCONTINUED | OUTPATIENT
Start: 2021-02-05 | End: 2021-02-05 | Stop reason: SDUPTHER

## 2021-02-05 RX ORDER — HYDROCODONE BITARTRATE AND ACETAMINOPHEN 5; 325 MG/1; MG/1
1 TABLET ORAL
Status: DISCONTINUED | OUTPATIENT
Start: 2021-02-05 | End: 2021-02-05 | Stop reason: HOSPADM

## 2021-02-05 RX ORDER — HYDRALAZINE HYDROCHLORIDE 20 MG/ML
5 INJECTION INTRAMUSCULAR; INTRAVENOUS EVERY 10 MIN PRN
Status: DISCONTINUED | OUTPATIENT
Start: 2021-02-05 | End: 2021-02-05 | Stop reason: HOSPADM

## 2021-02-05 RX ORDER — SODIUM CHLORIDE 0.9 % (FLUSH) 0.9 %
10 SYRINGE (ML) INJECTION PRN
Status: DISCONTINUED | OUTPATIENT
Start: 2021-02-05 | End: 2021-02-05 | Stop reason: HOSPADM

## 2021-02-05 RX ORDER — LIDOCAINE HYDROCHLORIDE 10 MG/ML
1 INJECTION, SOLUTION EPIDURAL; INFILTRATION; INTRACAUDAL; PERINEURAL
Status: DISCONTINUED | OUTPATIENT
Start: 2021-02-05 | End: 2021-02-05 | Stop reason: HOSPADM

## 2021-02-05 RX ORDER — DIPHENHYDRAMINE HYDROCHLORIDE 50 MG/ML
12.5 INJECTION INTRAMUSCULAR; INTRAVENOUS
Status: DISCONTINUED | OUTPATIENT
Start: 2021-02-05 | End: 2021-02-05 | Stop reason: HOSPADM

## 2021-02-05 RX ORDER — METOCLOPRAMIDE HYDROCHLORIDE 5 MG/ML
10 INJECTION INTRAMUSCULAR; INTRAVENOUS
Status: DISCONTINUED | OUTPATIENT
Start: 2021-02-05 | End: 2021-02-05 | Stop reason: HOSPADM

## 2021-02-05 RX ADMIN — PROPOFOL 100 MG: 10 INJECTION, EMULSION INTRAVENOUS at 09:16

## 2021-02-05 RX ADMIN — PROPOFOL 100 MG: 10 INJECTION, EMULSION INTRAVENOUS at 09:07

## 2021-02-05 RX ADMIN — PROPOFOL 125 MCG/KG/MIN: 10 INJECTION, EMULSION INTRAVENOUS at 09:07

## 2021-02-05 RX ADMIN — PROPOFOL 50 MG: 10 INJECTION, EMULSION INTRAVENOUS at 09:10

## 2021-02-05 RX ADMIN — SODIUM CHLORIDE, POTASSIUM CHLORIDE, SODIUM LACTATE AND CALCIUM CHLORIDE: 600; 310; 30; 20 INJECTION, SOLUTION INTRAVENOUS at 07:41

## 2021-02-05 RX ADMIN — PROPOFOL 50 MG: 10 INJECTION, EMULSION INTRAVENOUS at 09:13

## 2021-02-05 ASSESSMENT — PAIN SCALES - GENERAL
PAINLEVEL_OUTOF10: 2
PAINLEVEL_OUTOF10: 2

## 2021-02-05 ASSESSMENT — PULMONARY FUNCTION TESTS
PIF_VALUE: 0

## 2021-02-05 ASSESSMENT — ENCOUNTER SYMPTOMS
RHINORRHEA: 0
ANAL BLEEDING: 1
SHORTNESS OF BREATH: 0
NAUSEA: 0
ABDOMINAL PAIN: 0
SORE THROAT: 1
VOMITING: 1
COUGH: 0
CONSTIPATION: 0
TROUBLE SWALLOWING: 1
WHEEZING: 0
DIARRHEA: 0
RECTAL PAIN: 0
VOICE CHANGE: 1
ABDOMINAL DISTENTION: 1
CHOKING: 1
BLOOD IN STOOL: 1
CHEST TIGHTNESS: 1

## 2021-02-05 ASSESSMENT — PAIN DESCRIPTION - LOCATION
LOCATION: THROAT
LOCATION: THROAT

## 2021-02-05 NOTE — OP NOTE
PROCEDURE NOTE    DATE OF PROCEDURE: 2/5/2021    SURGEON: Vijay Ocampo MD    ASSISTANT: None    PREOPERATIVE DIAGNOSIS:   ABD PAINS  FAMILY HX OF COLON CANCER  WEIGHT LOSS  SCREENING    POSTOPERATIVE DIAGNOSIS: as described below    OPERATION: Total colonoscopy     ANESTHESIA: MAC PER ANESTHESIA     ESTIMATED BLOOD LOSS: less than 50     COMPLICATIONS: None. SPECIMENS:  Was Not Obtained    HISTORY: The patient is a 41y.o. year old female with history of above preop diagnosis. I recommended colonoscopy with possible biopsy or polypectomy and I explained the risk, benefits, expected outcome, and alternatives to the procedure. Risks included but are not limited to bleeding, infection, respiratory distress, hypotension, and perforation of the colon and possibility of missing a lesion. The patient understands and is in agreement. PROCEDURE: The patient was given IV conscious sedation. The patient's SPO2 remained above 90% throughout the procedure. The colonoscope was inserted per rectum and advanced under direct vision to the cecum without difficulty. The prep was good. Findings:  Terminal ileum: normal    Cecum/Ascending colon: normal    Transverse colon: abnormal: MOD DIVERTICULOSIS    Descending/Sigmoid colon: abnormal: MILD TO MOD DIVERTICULOSIS DIFFUSE    Rectum/Anus: examined in normal and retroflexed positions and was abnormal: SMALL INT HEMORRHODIS    Withdrawal Time was (minutes): 10    The colon was decompressed and the scope was removed. The patient tolerated the procedure well. Recommendations/Plan:   1. Lifestyle and dietary modifications as discussed  2. F/U In Office in 3-4 weeks  3. Discussed with the family  4. Colonoscopy Recall :5 year  5. POST SEDATION PATIENT WAS STABLE WITH STABLE VITAL SIGNS AND OXYGEN SATURATIONS AND WAS DISCHARGED HOME WITH RIDE IN A STABLE CONDITION.     Electronically signed by Vijay Ocampo MD  on 2/5/2021 at 9:32 AM

## 2021-02-05 NOTE — ANESTHESIA POSTPROCEDURE EVALUATION
Department of Anesthesiology  Postprocedure Note    Patient: Hayden Lee  MRN: [id number]  YOB: 1980  Date of evaluation: 2/5/2021  Time:  10:25 AM     Procedure Summary     Date: 02/05/21 Room / Location: [facility] ENDO 03 / [facility]    Anesthesia Start: 0906 Anesthesia Stop: 4841    Procedures:       EGD BIOPSY (N/A Esophagus)      COLONOSCOPY DIAGNOSTIC (N/A ) Diagnosis: (BILLOUS VOMITING W/NAUSEA FAMILY HISTORY OF COLON CANCER GERD WEIGHT LOSS ESOPHAGEAL DYSPHAGIA)    Surgeons: Faith Bustamante MD Responsible Provider: Glen Harper MD    Anesthesia Type: MAC ASA Status: 2          Anesthesia Type: MAC    Joshua Phase I: Joshua Score: 10    Joshua Phase II:      Last vitals: Reviewed and per EMR flowsheets.        Anesthesia Post Evaluation    Patient location during evaluation: bedside  Patient participation: complete - patient participated  Level of consciousness: awake and alert  Airway patency: patent  Nausea & Vomiting: no nausea and no vomiting  Complications: no  Cardiovascular status: hemodynamically stable  Respiratory status: acceptable  Hydration status: stable

## 2021-02-05 NOTE — ANESTHESIA PRE PROCEDURE
Department of Anesthesiology  Preprocedure Note       Name:  Remington Gale   Age:  41 y.o.  :  1980                                          MRN:  [id number]         Date:  2021      Surgeon: Jayashree Cleveland):  Dorothea Bourgeois MD    Procedure: Procedure(s):  EGD ESOPHAGOGASTRODUODENOSCOPY  COLONOSCOPY DIAGNOSTIC    Medications prior to admission:   Prior to Admission medications    Medication Sig Start Date End Date Taking? Authorizing Provider   polyethylene glycol (GLYCOLAX) 17 GM/SCOOP powder Use as directed by following your patient instructions given by office.  21  Yes Dorothea Bourgeois MD   bisacodyl (DULCOLAX) 5 MG EC tablet TAKE 4 TABS AS DIRECTED BY PHYSICIAN OFFICE 21  Yes Dorothea Bourgeois MD   ibuprofen (ADVIL;MOTRIN) 600 MG tablet TAKE ONE TABLET BY MOUTH EVERY 8 HOURS AS NEEDED FOR PAIN OR FEVER 19  Yes BRENDON Markham CNP   cetirizine (ZYRTEC) 10 MG tablet TAKE ONE TABLET BY MOUTH DAILY 10/21/20   BRENDON Markham CNP   triamcinolone (NASACORT) 55 MCG/ACT nasal inhaler 2 sprays by Nasal route daily 19   BRENDON Markham CNP   albuterol sulfate HFA (PROAIR HFA) 108 (90 Base) MCG/ACT inhaler Inhale 2 puffs into the lungs every 6 hours as needed for Wheezing 18   BRENDON Markham CNP       Current medications:    Current Facility-Administered Medications   Medication Dose Route Frequency Provider Last Rate Last Admin    sodium chloride flush 0.9 % injection 10 mL  10 mL Intravenous 2 times per day Rhonda Edward MD        sodium chloride flush 0.9 % injection 10 mL  10 mL Intravenous PRN Rhonda Edwrad MD        lidocaine PF 1 % injection 1 mL  1 mL Intradermal Once PRN Rhonda Edward MD        lactated ringers infusion   Intravenous Continuous Rhonda Edward MD           Allergies:  No Known Allergies    Problem List:    Patient Active Problem List   Diagnosis Code    Depression F32.9    Elevated antinuclear antibody (MICKY) level R76.8  Elevated C-reactive protein (CRP) R79.82    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  Esophageal dysphagia     Family history of colon cancer 1/21/2021    Maternal grandfather    GERD (gastroesophageal reflux disease)     Hematemesis/vomiting blood     x1    Nausea and vomiting     Rectal bleeding     Unintended weight loss        Past Surgical History:        Procedure Laterality Date    ABDOMINAL EXPLORATION SURGERY      for endometriosis.     KNEE SURGERY Right     scope bursitis     TUBAL LIGATION      WISDOM TOOTH EXTRACTION         Social History:    Social History     Tobacco Use    Smoking status: Never Smoker    Smokeless tobacco: Never Used   Substance Use Topics    Alcohol use: Yes     Comment: rare                                 Counseling given: Not Answered      Vital Signs (Current):   Vitals:    02/05/21 0710   BP: 98/66   Pulse: 93   Resp: 18   Temp: 98 °F (36.7 °C)   TempSrc: Infrared   SpO2: 99%   Weight: 170 lb (77.1 kg)   Height: 5' 2\" (1.575 m)                                              BP Readings from Last 3 Encounters:   02/05/21 98/66   01/21/21 129/87   10/06/20 114/70       NPO Status: Time of last liquid consumption: 2000                        Time of last solid consumption: 1400                        Date of last liquid consumption: 02/04/21                        Date of last solid food consumption: 02/03/21    BMI:   Wt Readings from Last 3 Encounters:   02/05/21 170 lb (77.1 kg)   01/29/21 171 lb (77.6 kg) 01/21/21 176 lb 14.4 oz (80.2 kg)     Body mass index is 31.09 kg/m². CBC:   Lab Results   Component Value Date    WBC 7.9 03/06/2018    RBC 4.18 03/06/2018    HGB 12.1 03/06/2018    HCT 38.9 03/06/2018    MCV 93.1 03/06/2018    RDW 12.1 03/06/2018     03/06/2018       CMP:   Lab Results   Component Value Date     03/06/2018    K 4.2 03/06/2018     03/06/2018    CO2 23 03/06/2018    BUN 10 03/06/2018    CREATININE 0.49 03/06/2018    GFRAA >60 03/06/2018    LABGLOM >60 03/06/2018    GLUCOSE 87 03/06/2018    PROT 6.9 03/06/2018    CALCIUM 9.5 03/06/2018    BILITOT 0.15 03/06/2018    ALKPHOS 61 03/06/2018    AST 20 03/06/2018    ALT 18 03/06/2018       POC Tests: No results for input(s): POCGLU, POCNA, POCK, POCCL, POCBUN, POCHEMO, POCHCT in the last 72 hours.     Coags: No results found for: PROTIME, INR, APTT    HCG (If Applicable):   Lab Results   Component Value Date    PREGTESTUR negative 02/26/2020        ABGs: No results found for: PHART, PO2ART, BWR1OGA, UEE8ELM, BEART, W3BXKAGW     Type & Screen (If Applicable):  No results found for: LABABO, LABRH    Drug/Infectious Status (If Applicable):  No results found for: HIV, HEPCAB    COVID-19 Screening (If Applicable):   Lab Results   Component Value Date    COVID19 Not Detected 02/01/2021    COVID19 Not Detected 11/11/2020         Anesthesia Evaluation  Patient summary reviewed and Nursing notes reviewed no history of anesthetic complications:   Airway: Mallampati: I  TM distance: >3 FB   Neck ROM: full  Mouth opening: > = 3 FB Dental: normal exam         Pulmonary:normal exam    (+) asthma:                            Cardiovascular:Negative CV ROS                      Neuro/Psych:   (+) depression/anxiety             GI/Hepatic/Renal:   (+) GERD:,           Endo/Other: Negative Endo/Other ROS                    Abdominal:           Vascular:                                        Anesthesia Plan      MAC     ASA 2

## 2021-02-05 NOTE — H&P
Patient states they have been NPO since before midnight. Medications last taken: None taken this morning. Anticoagulation status: Patient denies taking any anti-coagulants, including aspirin currently. Prep fully completed: Yes. Pertinent family hx: Denies family hx of esophageal CA. Denies any hx of PE/DVT. Denies hx of MRSA infection. Denies any personal or family hx of previous complications w/anesthesia. Nicotine status: Non-smoker currently. PAST MEDICAL HISTORY     Past Medical History:   Diagnosis Date    Abnormal Pap smear of cervix     follows with Linda    Anxiety     Asthma     physically induced    Depression     teen    Endometriosis     hx.  Esophageal dysphagia     Family history of colon cancer 1/21/2021    Maternal grandfather    GERD (gastroesophageal reflux disease)     Hematemesis/vomiting blood     x1    Nausea and vomiting     Rectal bleeding     Unintended weight loss        SURGICAL HISTORY       Past Surgical History:   Procedure Laterality Date    ABDOMINAL EXPLORATION SURGERY      for endometriosis.     KNEE SURGERY Right     scope bursitis     TUBAL LIGATION      WISDOM TOOTH EXTRACTION         SOCIAL HISTORY       Social History     Socioeconomic History    Marital status:      Spouse name: None    Number of children: None    Years of education: None    Highest education level: None   Occupational History    None   Social Needs    Financial resource strain: None    Food insecurity     Worry: None     Inability: None    Transportation needs     Medical: None     Non-medical: None   Tobacco Use    Smoking status: Never Smoker    Smokeless tobacco: Never Used   Substance and Sexual Activity    Alcohol use: Yes     Comment: rare     Drug use: Never    Sexual activity: None   Lifestyle    Physical activity     Days per week: None     Minutes per session: None    Stress: None   Relationships    Social connections     Talks on phone: None Gets together: None     Attends Pentecostalism service: None     Active member of club or organization: None     Attends meetings of clubs or organizations: None     Relationship status: None    Intimate partner violence     Fear of current or ex partner: None     Emotionally abused: None     Physically abused: None     Forced sexual activity: None   Other Topics Concern    None   Social History Narrative    None       REVIEW OF SYSTEMS    No Known Allergies    No current facility-administered medications on file prior to encounter. Current Outpatient Medications on File Prior to Encounter   Medication Sig Dispense Refill    polyethylene glycol (GLYCOLAX) 17 GM/SCOOP powder Use as directed by following your patient instructions given by office. 238 g 0    bisacodyl (DULCOLAX) 5 MG EC tablet TAKE 4 TABS AS DIRECTED BY PHYSICIAN OFFICE 4 tablet 0    cetirizine (ZYRTEC) 10 MG tablet TAKE ONE TABLET BY MOUTH DAILY 30 tablet 5    ibuprofen (ADVIL;MOTRIN) 600 MG tablet TAKE ONE TABLET BY MOUTH EVERY 8 HOURS AS NEEDED FOR PAIN OR FEVER 30 tablet 0    triamcinolone (NASACORT) 55 MCG/ACT nasal inhaler 2 sprays by Nasal route daily 1 Inhaler 0    albuterol sulfate HFA (PROAIR HFA) 108 (90 Base) MCG/ACT inhaler Inhale 2 puffs into the lungs every 6 hours as needed for Wheezing 1 Inhaler 3     (Notation: Medications listed above are not currently reconciled at the signing of this H&P note, to be reconciled in pre-op per RN)    Review of Systems   Constitutional: Positive for fatigue and unexpected weight change (Loss). Negative for appetite change, chills and fever. HENT: Positive for sore throat, trouble swallowing and voice change. Negative for congestion, ear pain, postnasal drip and rhinorrhea. Respiratory: Positive for choking and chest tightness (Denies currently). Negative for cough, shortness of breath and wheezing. Cardiovascular: Negative for chest pain, palpitations and leg swelling. Gastrointestinal: Positive for abdominal distention, anal bleeding, blood in stool and vomiting. Negative for abdominal pain, constipation, diarrhea, nausea and rectal pain. Genitourinary: Negative. Neurological: Negative for dizziness. Hematological: Does not bruise/bleed easily. GENERAL PHYSICAL EXAM     Vitals: Review current vital signs per RN flow sheet. GENERAL APPEARANCE:   Ifeoma Cornejo is 41 y.o.,  female, not obese, nourished, conscious, alert. Does not appear to be in any distress or pain at this time. Physical Exam   Constitutional: She is oriented to person, place, and time. She appears well-developed and well-nourished. Non-toxic appearance. She does not have a sickly appearance. She does not appear ill. No distress. HENT:   Head: Normocephalic. Right Ear: External ear normal.   Left Ear: External ear normal.   Mouth/Throat: Oropharynx is clear and moist and mucous membranes are normal. No oropharyngeal exudate, posterior oropharyngeal edema, posterior oropharyngeal erythema or tonsillar abscesses. Eyes: Conjunctivae are normal. Right eye exhibits no discharge. Left eye exhibits no discharge. Cardiovascular: Normal rate, regular rhythm, normal heart sounds and intact distal pulses. Pulses:       Radial pulses are 2+ on the right side and 2+ on the left side. Dorsalis pedis pulses are 2+ on the right side and 2+ on the left side. Posterior tibial pulses are 2+ on the right side and 2+ on the left side. Pulmonary/Chest: Effort normal and breath sounds normal. No respiratory distress. She has no wheezes. She has no rales. Abdominal: Soft. Normal appearance and bowel sounds are normal. She exhibits no distension and no mass. There is no abdominal tenderness. There is no rebound and no guarding. Musculoskeletal: Normal range of motion. Right lower leg: She exhibits no tenderness and no swelling. Left lower leg:  She exhibits no tenderness and no swelling. Neurological: She is alert and oriented to person, place, and time. Skin: Skin is warm and dry. She is not diaphoretic. Psychiatric: She has a normal mood and affect.  Her behavior is normal.       RECENT LAB WORK     Lab Results   Component Value Date     03/06/2018    K 4.2 03/06/2018     03/06/2018    CO2 23 03/06/2018    BUN 10 03/06/2018    CREATININE 0.49 (L) 03/06/2018    GLUCOSE 87 03/06/2018    CALCIUM 9.5 03/06/2018    PROT 6.9 03/06/2018    LABALBU 4.3 03/06/2018    BILITOT 0.15 (L) 03/06/2018    ALKPHOS 61 03/06/2018    AST 20 03/06/2018    ALT 18 03/06/2018    LABGLOM >60 03/06/2018    GFRAA >60 03/06/2018     Lab Results   Component Value Date    WBC 7.9 03/06/2018    HGB 12.1 03/06/2018    HCT 38.9 03/06/2018    MCV 93.1 03/06/2018     03/06/2018     PROVISIONAL DIAGNOSES / SURGERY:      COLONOSCOPY DIAGNOSTIC for BILIOUS VOMITING W/NAUSEA, FAMILY HX OF COLON CA, GERD, WEIGHT LOSS, ESOPHAGEAL DYSPHAGIA    EGD    Patient Active Problem List    Diagnosis Date Noted    Hematemesis/vomiting blood     Rectal bleeding     Family history of colon cancer 01/21/2021    Weight loss 01/21/2021    Esophageal dysphagia 01/21/2021    GERD (gastroesophageal reflux disease)     Nausea and vomiting     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

## 2021-02-08 LAB — SURGICAL PATHOLOGY REPORT: NORMAL

## 2021-02-09 PROBLEM — K29.60 EROSIVE GASTRITIS: Status: ACTIVE | Noted: 2021-02-05

## 2021-02-09 PROBLEM — K22.70 BARRETT'S ESOPHAGUS: Status: ACTIVE | Noted: 2021-02-05

## 2021-02-18 ENCOUNTER — OFFICE VISIT (OUTPATIENT)
Dept: GASTROENTEROLOGY | Age: 41
End: 2021-02-18
Payer: COMMERCIAL

## 2021-02-18 VITALS
SYSTOLIC BLOOD PRESSURE: 129 MMHG | HEART RATE: 91 BPM | BODY MASS INDEX: 32.39 KG/M2 | WEIGHT: 176 LBS | DIASTOLIC BLOOD PRESSURE: 75 MMHG | HEIGHT: 62 IN

## 2021-02-18 DIAGNOSIS — R11.14 BILIOUS VOMITING WITH NAUSEA: ICD-10-CM

## 2021-02-18 DIAGNOSIS — K22.70 BARRETT'S ESOPHAGUS WITHOUT DYSPLASIA: Primary | ICD-10-CM

## 2021-02-18 DIAGNOSIS — K21.00 GASTROESOPHAGEAL REFLUX DISEASE WITH ESOPHAGITIS WITHOUT HEMORRHAGE: ICD-10-CM

## 2021-02-18 DIAGNOSIS — K29.60 EROSIVE GASTRITIS: ICD-10-CM

## 2021-02-18 DIAGNOSIS — R13.19 ESOPHAGEAL DYSPHAGIA: ICD-10-CM

## 2021-02-18 PROCEDURE — 99213 OFFICE O/P EST LOW 20 MIN: CPT | Performed by: INTERNAL MEDICINE

## 2021-02-18 RX ORDER — FAMOTIDINE 20 MG/1
20 TABLET, FILM COATED ORAL 2 TIMES DAILY
Qty: 180 TABLET | Refills: 1 | Status: SHIPPED | OUTPATIENT
Start: 2021-02-18 | End: 2022-02-15

## 2021-02-18 ASSESSMENT — ENCOUNTER SYMPTOMS
ABDOMINAL PAIN: 0
RESPIRATORY NEGATIVE: 1
BLOOD IN STOOL: 0
TROUBLE SWALLOWING: 1
NAUSEA: 0
ANAL BLEEDING: 0
DIARRHEA: 0
RECTAL PAIN: 0
ABDOMINAL DISTENTION: 1
VOMITING: 1
CONSTIPATION: 0
EYES NEGATIVE: 1

## 2021-02-18 NOTE — PROGRESS NOTES
Same                                                                                                     GI OFFICE FOLLOW UP    Jo Samano is a 41 y.o. female evaluated via on 2/18/2021. Consent:  She and/or health care decision maker is aware that that she may receive a bill for this telephone service, depending on her insurance coverage, and has provided verbal consent to proceed: YES      INTERVAL HISTORY:   No referring provider defined for this encounter. Chief Complaint   Patient presents with    Follow-up     Patient is a f/u for recent colon/egd. Patient notes improvement regarding vomiting. Denies nauesea and abd pain. 1. Flores's esophagus without dysplasia    2. Esophageal dysphagia    3. Erosive gastritis    4. Gastroesophageal reflux disease with esophagitis without hemorrhage    5. Bilious vomiting with nausea         The patient is here as a follow up of her recent GI procedure.    The results have been sent to you separately   The findings were explained to the patient in detail and biopsies were also discussed   with her    This patient evaluated in my office today as a follow-up after her recent EGD and colonoscopy ports    She is found to have mild distal esophagitis and biopsies have revealed positivity for intestinal metaplasia she has history for chronic intermittent nausea and vomiting        Also has family stay for colon cancer some irritable bowel syndrome-like symptom her colonoscopy revealed diverticulosis and internal hemorrhoids patient is feeling little bit better after eating healthier food however still complains of some    Off-and-on nausea symptoms abdominal bloating and gas symptoms    She is not taking any medications for GERD symptoms    No smoking alcohol abuse illicit drug usage    Some anxiety issues  HISTORY OF PRESENT ILLNESS: Andrés Mccullough is a 41 y.o. female with a past history remarkable for , referred for evaluation of   Chief Complaint   Patient presents with    Follow-up     Patient is a f/u for recent colon/egd. Patient notes improvement regarding vomiting. Denies nauesea and abd pain. .    Past Medical,Family, and Social History reviewed and does contribute to the patient presenting condition. Patient's PMH/PSH,SH,PSYCH Hx, MEDs, ALLERGIES, and ROS were all reviewed and updated in the appropriate sections. PAST MEDICAL HISTORY:  Past Medical History:   Diagnosis Date    Abnormal Pap smear of cervix     follows with Linda    Anxiety     Asthma     physically induced    Flores's esophagus 02/05/2021    Depression     teen    Endometriosis     hx.  Erosive gastritis 02/05/2021    Esophageal dysphagia     Family history of colon cancer 01/21/2021    Maternal grandfather    GERD (gastroesophageal reflux disease)     Hematemesis/vomiting blood     x1    Nausea and vomiting     Rectal bleeding     Unintended weight loss        Past Surgical History:   Procedure Laterality Date    ABDOMINAL EXPLORATION SURGERY      for endometriosis.     COLONOSCOPY N/A 02/05/2021    COLONOSCOPY DIAGNOSTIC performed by Marino Desai MD at [facility] Right     scope bursitis     TUBAL LIGATION      UPPER GASTROINTESTINAL ENDOSCOPY N/A 02/05/2021    FLORES'S    WISDOM TOOTH EXTRACTION         CURRENT MEDICATIONS:    Current Outpatient Medications:     famotidine (PEPCID) 20 MG tablet, Take 1 tablet by mouth 2 times daily, Disp: 180 tablet, Rfl: 1    cetirizine (ZYRTEC) 10 MG tablet, TAKE ONE TABLET BY MOUTH DAILY, Disp: 30 tablet, Rfl: 5    ibuprofen (ADVIL;MOTRIN) 600 MG tablet, TAKE ONE TABLET BY MOUTH EVERY 8 HOURS AS NEEDED FOR PAIN OR FEVER, Disp: 30 tablet, Rfl: 0    triamcinolone (NASACORT) 55 MCG/ACT nasal inhaler, 2 sprays by Nasal route daily, Disp: 1 Inhaler, Rfl: 0    albuterol sulfate HFA (PROAIR HFA) 108 (90 Base) MCG/ACT inhaler, Inhale 2 puffs into the lungs every 6 hours as needed for Wheezing, Disp: 1 Inhaler, Rfl: 3    ALLERGIES:   No Known Allergies    FAMILY HISTORY:       Problem Relation Age of Onset    Arthritis Mother     Diabetes Mother     High Cholesterol Mother     Arthritis Father     Diabetes Father     High Cholesterol Father     Arthritis Sister     Arthritis Brother     Arthritis Maternal Aunt     Arthritis Maternal Uncle     Arthritis Paternal Aunt     Arthritis Paternal Uncle     Arthritis Maternal Grandmother     Asthma Maternal Grandmother     Diabetes Maternal Grandmother     Heart Disease Maternal Grandmother         CHF    High Blood Pressure Maternal Grandmother     Anemia Maternal Grandfather     Arthritis Maternal Grandfather     Colon Cancer Maternal Grandfather     Prostate Cancer Maternal Grandfather     Cancer Maternal Grandfather         skin    High Blood Pressure Maternal Grandfather     Arthritis Paternal Grandmother     Cancer Paternal Grandmother         stomach    Stroke Paternal Grandmother     Asthma Maternal Cousin     Other Other         Possible hx of blood clots         SOCIAL HISTORY:   Social History     Socioeconomic History    Marital status:      Spouse name: Not on file    Number of children: Not on file    Years of education: Not on file    Highest education level: Not on file   Occupational History    Not on file   Social Needs    Financial resource strain: Not on file    Food insecurity     Worry: Not on file     Inability: Not on file    Transportation needs     Medical: Not on file     Non-medical: Not on file   Tobacco Use    Smoking status: Never Smoker    Smokeless tobacco: Never Used    Tobacco comment: Few cigarettes a long time ago   Substance and Sexual Activity    Alcohol use: Yes     Comment: rare     Drug use: Never    Sexual activity: Not on file   Lifestyle    Physical activity     Days per week: Not on file     Minutes per session: Not on file    Stress: Not on file Relationships    Social connections     Talks on phone: Not on file     Gets together: Not on file     Attends Sabianism service: Not on file     Active member of club or organization: Not on file     Attends meetings of clubs or organizations: Not on file     Relationship status: Not on file    Intimate partner violence     Fear of current or ex partner: Not on file     Emotionally abused: Not on file     Physically abused: Not on file     Forced sexual activity: Not on file   Other Topics Concern    Not on file   Social History Narrative    Not on file         REVIEW OF SYSTEMS:         Review of Systems   Constitutional: Positive for appetite change (starving after sick, fill up fast). HENT: Positive for trouble swallowing. Negative for congestion. Eyes: Negative. Respiratory: Negative. Cardiovascular: Negative. Gastrointestinal: Positive for abdominal distention and vomiting (improved). Negative for abdominal pain, anal bleeding, blood in stool, constipation, diarrhea, nausea and rectal pain. Endocrine: Negative. Genitourinary: Negative. Musculoskeletal: Negative. Skin: Negative. Neurological: Negative. Hematological: Negative. Psychiatric/Behavioral: Negative. PHYSICAL EXAMINATION: Vital signs reviewed per the nursing documentation. /75   Pulse 91   Ht 5' 2\" (1.575 m)   Wt 176 lb (79.8 kg)   LMP 02/05/2021 Comment: urine pregnancy test negative 2/5/21  BMI 32.19 kg/m²   Body mass index is 32.19 kg/m². Physical Exam  Nursing note reviewed. Constitutional:       Appearance: She is well-developed. Comments: Anxious    HENT:      Head: Normocephalic and atraumatic. Eyes:      Conjunctiva/sclera: Conjunctivae normal.      Pupils: Pupils are equal, round, and reactive to light. Neck:      Musculoskeletal: Normal range of motion and neck supple. Cardiovascular:      Heart sounds: Normal heart sounds.    Pulmonary:      Effort: Pulmonary effort is normal.      Breath sounds: Normal breath sounds. Abdominal:      General: Bowel sounds are normal.      Palpations: Abdomen is soft. Comments: NON TENDER, NON DISTENTED  LIVER SPLEEN AND HERNIAS ARE NOT  PALPABLE  BOWEL SOUNDS ARE POSITIVE      Musculoskeletal: Normal range of motion. Skin:     General: Skin is warm. Neurological:      Mental Status: She is alert and oriented to person, place, and time. Psychiatric:         Behavior: Behavior normal.           LABORATORY DATA: Reviewed  Lab Results   Component Value Date    WBC 7.9 03/06/2018    HGB 12.1 03/06/2018    HCT 38.9 03/06/2018    MCV 93.1 03/06/2018     03/06/2018     03/06/2018    K 4.2 03/06/2018     03/06/2018    CO2 23 03/06/2018    BUN 10 03/06/2018    CREATININE 0.49 (L) 03/06/2018    LABALBU 4.3 03/06/2018    BILITOT 0.15 (L) 03/06/2018    ALKPHOS 61 03/06/2018    AST 20 03/06/2018    ALT 18 03/06/2018         Lab Results   Component Value Date    RBC 4.18 03/06/2018    HGB 12.1 03/06/2018    MCV 93.1 03/06/2018    MCH 28.9 03/06/2018    MCHC 31.1 03/06/2018    RDW 12.1 03/06/2018    MPV 11.0 03/06/2018         DIAGNOSTIC TESTING:     No results found. Assessment  1. Flores's esophagus without dysplasia    2. Esophageal dysphagia    3. Erosive gastritis    4. Gastroesophageal reflux disease with esophagitis without hemorrhage    5. Bilious vomiting with nausea        Plan     Start this patient on famotidine 20 mg twice daily     Pt seems to have signs and symptoms consistent with GERD, acid indigestion and heartburns. She was discussed  in detail about some possible life style and dietary modifications. She was stressed about the maintenance  of appropriate weight and effect of obesity contributing to reflux symptoms. Routine exercise was streesed. Avoidance of Caffeine, nicotine and chocolate were explained. Pt was asked to avoid spices grease and fried food.  Advices were also given about avoidance of any kind of fast foods, soda pops and high energy drinks. Pt was advised to place two small block under the head end of the bed which may help with night time reflux. Was advised not to eat any thin at least 2-3 hrs before going to bed and walk especially after dinner    Pt has verbalized understanding and agreement to this plan. Pt was advised in detail about some life style and dietary modifications. She was advised about avoidance of caffeine, nicotine and chocolate. Pt was also told to stay away from any kind of fast foods, soda pops. She was also advised to avoid lots of spices, grease and fried food etc.     Instructions were also given about trying to arrange the timing, quality and quantity of food. Instructions were given about using ample amount of fiber including dietary and supplemental fiber either metamucil, bennafiber or citrucell etc.  Pt was advised about drinking ample amount of water without any colors or chemicals. Stress was given about regular exercise. Pt has verbalized understanding and agreement to these modifications. The patient was instructed to start taking some OTC Probiotics products   These are available over the counter at the Pharmacy stores and Grocery stores  He was explained about the beneficial effects they have in the GI track  They will help to establish the good bacterial maikol and will help with the digestion and bowel movements  The patient has verbalized understanding and agreement to this plan    Pt was given advices and instructions about weight loss. She was advised about the significance of exercise at least three times a week . Dietary advices were also given about the avoidance of fast food, fried food and lots of spices and grease.      nstructions were given about using ample amount of fiber including dietary and supplemental fiber either metamucil, bennafiber or citrucell etc.  Pt was advised about drinking ample amount of water without any colors or chemicals. Stress was given about regular exercise. Pt was told to stay way from soda pops. Pt has verbalized understanding and agrrement  More than half of patient's clinic visit time was spent in counseling about lifestyle and dietary modifications  Patient's  questions were answered in this regard as well  The patient has verbalized understanding and agreement     Discussed with her  and their questions were answered       We will see her back in 6 months she was asked to call me if there is any questions problems or issues      I communicated with the patient and/or health care decision maker about   Details of this discussion including any medical advice provided:YES      I affirm this is a Patient Initiated Episode with an Established Patient who has not had a related appointment within my department in the past 7 days or scheduled within the next 24 hours. Total Time: minutes: 21-30 minutes    Note: not billable if this call serves to triage the patient into an appointment for the relevant concern      Thank you for allowing me to participate in the care of Ms. Pena. For any further questions please do not hesitate to contact me. I have reviewed and agree with the ROS entered by the MA/GALENN.          Rayshawn Sun MD, [facility]  Board Certified in Gastroenterology and [address] [place] [address] Gastroenterology  Office #: [phone]

## 2021-03-23 ENCOUNTER — HOSPITAL ENCOUNTER (OUTPATIENT)
Age: 41
Setting detail: SPECIMEN
Discharge: HOME | End: 2021-03-23
Payer: COMMERCIAL

## 2021-03-23 ENCOUNTER — OFFICE VISIT (OUTPATIENT)
Dept: OBGYN CLINIC | Age: 41
End: 2021-03-23
Payer: COMMERCIAL

## 2021-03-23 VITALS
HEIGHT: 62 IN | TEMPERATURE: 99.3 F | SYSTOLIC BLOOD PRESSURE: 114 MMHG | DIASTOLIC BLOOD PRESSURE: 80 MMHG | BODY MASS INDEX: 33.13 KG/M2 | WEIGHT: 180 LBS

## 2021-03-23 DIAGNOSIS — Z01.419 WELL WOMAN EXAM WITH ROUTINE GYNECOLOGICAL EXAM: Primary | ICD-10-CM

## 2021-03-23 DIAGNOSIS — Z11.51 SCREENING FOR HPV (HUMAN PAPILLOMAVIRUS): ICD-10-CM

## 2021-03-23 DIAGNOSIS — N92.0 MENORRHAGIA WITH REGULAR CYCLE: ICD-10-CM

## 2021-03-23 DIAGNOSIS — N94.6 DYSMENORRHEA: ICD-10-CM

## 2021-03-23 DIAGNOSIS — R87.610 ASCUS WITH POSITIVE HIGH RISK HPV CERVICAL: ICD-10-CM

## 2021-03-23 DIAGNOSIS — Z12.31 ENCOUNTER FOR SCREENING MAMMOGRAM FOR MALIGNANT NEOPLASM OF BREAST: ICD-10-CM

## 2021-03-23 DIAGNOSIS — R87.810 ASCUS WITH POSITIVE HIGH RISK HPV CERVICAL: ICD-10-CM

## 2021-03-23 DIAGNOSIS — Z98.890 HISTORY OF LOOP ELECTRICAL EXCISION PROCEDURE (LEEP): ICD-10-CM

## 2021-03-23 LAB
ABSOLUTE EOS #: 0.14 K/UL (ref 0–0.44)
ABSOLUTE IMMATURE GRANULOCYTE: 0.07 K/UL (ref 0–0.3)
ABSOLUTE LYMPH #: 4.07 K/UL (ref 1.1–3.7)
ABSOLUTE MONO #: 0.82 K/UL (ref 0.1–1.2)
BASOPHILS # BLD: 0 % (ref 0–2)
BASOPHILS ABSOLUTE: 0.05 K/UL (ref 0–0.2)
DIFFERENTIAL TYPE: ABNORMAL
EOSINOPHILS RELATIVE PERCENT: 1 % (ref 1–4)
HCG QUANTITATIVE: <1 IU/L
HCT VFR BLD CALC: 39.8 % (ref 36.3–47.1)
HEMOGLOBIN: 12.6 G/DL (ref 11.9–15.1)
IMMATURE GRANULOCYTES: 1 %
INR BLD: 0.9
LYMPHOCYTES # BLD: 31 % (ref 24–43)
MCH RBC QN AUTO: 29.2 PG (ref 25.2–33.5)
MCHC RBC AUTO-ENTMCNC: 31.7 G/DL (ref 28.4–34.8)
MCV RBC AUTO: 92.1 FL (ref 82.6–102.9)
MONOCYTES # BLD: 6 % (ref 3–12)
NRBC AUTOMATED: 0 PER 100 WBC
PARTIAL THROMBOPLASTIN TIME: 25.6 SEC (ref 20.5–30.5)
PDW BLD-RTO: 12.3 % (ref 11.8–14.4)
PLATELET # BLD: 341 K/UL (ref 138–453)
PLATELET ESTIMATE: ABNORMAL
PMV BLD AUTO: 10.9 FL (ref 8.1–13.5)
PROTHROMBIN TIME: 9.5 SEC (ref 9.1–12.3)
RBC # BLD: 4.32 M/UL (ref 3.95–5.11)
RBC # BLD: ABNORMAL 10*6/UL
SEG NEUTROPHILS: 61 % (ref 36–65)
SEGMENTED NEUTROPHILS ABSOLUTE COUNT: 7.98 K/UL (ref 1.5–8.1)
TSH SERPL DL<=0.05 MIU/L-ACNC: 1.04 MIU/L (ref 0.3–5)
WBC # BLD: 13.1 K/UL (ref 3.5–11.3)
WBC # BLD: ABNORMAL 10*3/UL

## 2021-03-23 PROCEDURE — 99396 PREV VISIT EST AGE 40-64: CPT | Performed by: NURSE PRACTITIONER

## 2021-03-23 ASSESSMENT — PATIENT HEALTH QUESTIONNAIRE - PHQ9
SUM OF ALL RESPONSES TO PHQ QUESTIONS 1-9: 0
SUM OF ALL RESPONSES TO PHQ9 QUESTIONS 1 & 2: 0
2. FEELING DOWN, DEPRESSED OR HOPELESS: 0
1. LITTLE INTEREST OR PLEASURE IN DOING THINGS: 0
SUM OF ALL RESPONSES TO PHQ QUESTIONS 1-9: 0

## 2021-03-23 NOTE — PROGRESS NOTES
History and Physical  [address], [address] [facility] [address] [address] [facility] [address] [phone]   Fax [phone]  Ngoc Martinez  3/23/2021              41 y.o. Chief Complaint   Patient presents with    Annual Exam       Patient's last menstrual period was 2021. Primary Care Physician: BRENDON Talbert CNP    The patient was seen and examined. She has no chief complaint today and is here for her annual exam.  Her bowels are regular. There are no voiding complaints. She denies any bloating. She denies vaginal discharge and was counseled on STD's and the need for barrier contraception. HPI : Ngoc Martinez is a 41 y.o. female     Annual exam  Hx of endometriosis- first diagnosed at age 10- had surgical intervention and pain improved. Hx of TL. Complaining of increasing painful menses over the past 1 year. Pain starts 2 weeks before menses and lasts 1 week after her period. Periods occur every month, lasting 5 days long. Second day is heaviest.  ________________________________________________________________________  OB History    Para Term  AB Living   2 2 2 0 0 0   SAB TAB Ectopic Molar Multiple Live Births   0 0 0 0 0 0      # Outcome Date GA Lbr Geovani/2nd Weight Sex Delivery Anes PTL Lv   2 Term            1 Term              Past Medical History:   Diagnosis Date    Abnormal Pap smear of cervix     follows with Linda    Anxiety     Asthma     physically induced    Flores's esophagus 2021    Depression     teen    Endometriosis     hx.     Erosive gastritis 2021    Esophageal dysphagia     Family history of colon cancer 2021    Maternal grandfather    GERD (gastroesophageal reflux disease)     Hematemesis/vomiting blood     x1    Nausea and vomiting     Rectal bleeding     Unintended weight loss                                                                    Past Surgical History: Procedure Laterality Date    ABDOMINAL EXPLORATION SURGERY      for endometriosis.     COLONOSCOPY N/A 02/05/2021    COLONOSCOPY DIAGNOSTIC performed by Anita Mccormick MD at [facility] Right     scope bursitis     TUBAL LIGATION      UPPER GASTROINTESTINAL ENDOSCOPY N/A 02/05/2021    [facility]    WISDOM TOOTH EXTRACTION       Family History   Problem Relation Age of Onset    Arthritis Mother     Diabetes Mother     High Cholesterol Mother     Arthritis Father     Diabetes Father     High Cholesterol Father     Arthritis Sister     Arthritis Brother     Arthritis Maternal Aunt     Arthritis Maternal Uncle     Arthritis Paternal Aunt     Arthritis Paternal Uncle     Arthritis Maternal Grandmother     Asthma Maternal Grandmother     Diabetes Maternal Grandmother     Heart Disease Maternal Grandmother         CHF    High Blood Pressure Maternal Grandmother     Anemia Maternal Grandfather     Arthritis Maternal Grandfather     Colon Cancer Maternal Grandfather     Prostate Cancer Maternal Grandfather     Cancer Maternal Grandfather         skin    High Blood Pressure Maternal Grandfather     Arthritis Paternal Grandmother     Cancer Paternal Grandmother         stomach    Stroke Paternal Grandmother     Asthma Maternal Cousin     Other Other         Possible hx of blood clots     Social History     Socioeconomic History    Marital status:      Spouse name: Not on file    Number of children: Not on file    Years of education: Not on file    Highest education level: Not on file   Occupational History    Not on file   Social Needs    Financial resource strain: Not on file    Food insecurity     Worry: Not on file     Inability: Not on file    Transportation needs     Medical: Not on file     Non-medical: Not on file   Tobacco Use    Smoking status: Never Smoker    Smokeless tobacco: Never Used    Tobacco comment: Few cigarettes a long time ago   Substance and Sexual Activity    Alcohol use: Yes     Comment: rare     Drug use: Never    Sexual activity: Not on file   Lifestyle    Physical activity     Days per week: Not on file     Minutes per session: Not on file    Stress: Not on file   Relationships    Social connections     Talks on phone: Not on file     Gets together: Not on file     Attends Sabianism service: Not on file     Active member of club or organization: Not on file     Attends meetings of clubs or organizations: Not on file     Relationship status: Not on file    Intimate partner violence     Fear of current or ex partner: Not on file     Emotionally abused: Not on file     Physically abused: Not on file     Forced sexual activity: Not on file   Other Topics Concern    Not on file   Social History Narrative    Not on file       MEDICATIONS:  Current Outpatient Medications   Medication Sig Dispense Refill    famotidine (PEPCID) 20 MG tablet Take 1 tablet by mouth 2 times daily 180 tablet 1    cetirizine (ZYRTEC) 10 MG tablet TAKE ONE TABLET BY MOUTH DAILY 30 tablet 5    ibuprofen (ADVIL;MOTRIN) 600 MG tablet TAKE ONE TABLET BY MOUTH EVERY 8 HOURS AS NEEDED FOR PAIN OR FEVER 30 tablet 0    triamcinolone (NASACORT) 55 MCG/ACT nasal inhaler 2 sprays by Nasal route daily 1 Inhaler 0    albuterol sulfate HFA (PROAIR HFA) 108 (90 Base) MCG/ACT inhaler Inhale 2 puffs into the lungs every 6 hours as needed for Wheezing 1 Inhaler 3     No current facility-administered medications for this visit. ALLERGIES:  Allergies as of 03/23/2021    (No Known Allergies)       Symptoms of decreased mood absent  Symptoms of anhedonia absent    **If either question is answered in a  positive fashion then complete the PHQ9 Scoring Evaluation and make the appropriate referral**      Immunization status: stated as current, but no records available.       Gynecologic History:  Menarche: 13 yo  Menopause at [address] [facility] yo     Patient's last menstrual period was 03/07/2021. Sexually Active: Yes    STD History: No     Permanent Sterilization: Yes TL   Reversible Birth Control: No        Hormone Replacement Exposure: No      Genetic Qualified Family History of Breast, Ovarian , Colon or Uterine Cancer: No     If YES see scanned worksheet. Preventative Health Testing:    Health Maintenance:  Health Maintenance Due   Topic Date Due    Hepatitis C screen  Never done    HIV screen  Never done    Flu vaccine (1) Never done    Diabetes screen  02/09/2021       Date of Last Pap Smear: 10/6/2020 ASCUS/- neg hpv  Abnormal Pap Smear History: yes, HX leep 2004/2005  Colposcopy History: 2/26/2020 benign  Date of Last Mammogram: 12/3/2020 negative  Date of Last Colonoscopy:   Date of Last Bone Density:      ________________________________________________________________________        REVIEW OF SYSTEMS:    yes   A minimum of an eleven point review of systems was completed. Review Of Systems (11 point):  Constitutional: No fever, chills or malaise; No weight change or fatigue  Head and Eyes: No vision, Headache, Dizziness or trauma in last 12 months  ENT ROS: No hearing, Tinnitis, sinus or taste problems  Hematological and Lymphatic ROS:No Lymphoma, Von Willebrand's, Hemophillia or Bleeding History  Psych ROS: No Depression, Homicidal thoughts,suicidal thoughts, or anxiety  Breast ROS: No prior breast abnormalities or lumps  Respiratory ROS: No SOB, Pneumoniae,Cough, or Pulmonary Embolism History  Cardiovascular ROS: No Chest Pain with Exertion, Palpitations, Syncope, Edema, Arrhythmia  Gastrointestinal ROS: No Indigestion, Heartburn, Nausea, vomiting, Diarrhea, Constipation,or Bowel Changes; No Bloody Stools or melena  Genito-Urinary ROS: No Dysuria, Hematuria or Nocturia.  No Urinary Incontinence or Vaginal Discharge. + heavy, painful menses  Musculoskeletal ROS: No Arthralgia, Arthritis,Gout,Osteoporosis or Rheumatism  Neurological ROS: No CVA, Migraines, Epilepsy, Seizure Hx, or Limb Weakness  Dermatological ROS: No Rash, Itching, Hives, Mole Changes or Cancer                                                                                                                                                                                                                                  PHYSICAL Exam:     Constitutional:  Vitals:    03/23/21 1526   BP: 114/80   Site: Right Upper Arm   Position: Sitting   Cuff Size: Medium Adult   Temp: 99.3 °F (37.4 °C)   Weight: 180 lb (81.6 kg)   Height: 5' 2\" (1.575 m)       Chaperone for Intimate Exam   Chaperone was offered and accepted as part of the rooming process.  Chaperone: Sindy          General Appearance: This  is a well Developed, well Nourished, well groomed female. Her BMI was reviewed. Nutritional decision making was discussed. Skin:  There was a Normal Inspection of the skin without rashes or lesions. There were no rashes. (Papular, Maculopapular, Hives, Pustular, Macular)     There were no lesions (Ulcers, Erythema, Abn. Appearing Nevi)            Lymphatic:  No Lymph Nodes were Palpable in the neck , axilla or groin.  0 # Of Lymph Nodes; Location ; Character [Normal]  [Shotty] [Tender] [Enlarged]     Neck and EENT:  The neck was supple. There were no masses   The thyroid was not enlarged and had no masses. Perrla, EOMI B/L, TMI B/L No Abnormalities. Throat inspected-No exudates or Masses, Nares Patent No Masses        Respiratory: The lungs were auscultated and found to be clear. There were no rales, rhonchi or wheezes. There was a good respiratory effort. Cardiovascular: The heart was in a regular rate and rhythm. . No S3 or S4. There was no murmur appreciated. Location, grade, and radiation are not applicable. Extremities: The patients extremities were without calf tenderness, edema, or varicosities. There was full range of motion in all four extremities.  Pulses in all four extremities were appreciated and are 2/4. Abdomen: The abdomen was soft and non-tender. There were good bowel sounds in all quadrants and there was no guarding, rebound or rigidity. On evaluation there was no evidence of hepatosplenomegaly and there was no costal vertebral taylor tenderness bilaterally. No hernias were appreciated. Abdominal Scars: intact    Psych: The patient had a normal Orientation to: Time, Place, Person, and Situation  There is no Mood / Affect changes    Breast:  (Chest)  normal appearance, no masses or tenderness  Self breast exams were reviewed in detail. Literature was given. Pelvic Exam:  Vulva and vagina appear normal. Bimanual exam reveals normal uterus and adnexa. Rectal Exam:  exam declined by patient          Musculosk:  Normal Gait and station was noted. Digits were evaluated without abnormal findings. Range of motion, stability and strength were evaluated and found to be appropriate for the patients age. ASSESSMENT:      41 y.o. Annual   Diagnosis Orders   1. Well woman exam with routine gynecological exam     2. Screening for HPV (human papillomavirus)  PAP SMEAR   3. ASCUS with positive high risk HPV cervical  PAP SMEAR   4. Encounter for screening mammogram for malignant neoplasm of breast  BULL DIGITAL SCREEN W OR WO CAD BILATERAL   5. History of loop electrical excision procedure (LEEP)  PAP SMEAR   6. Menorrhagia with regular cycle  US NON OB TRANSVAGINAL    US PELVIS COMPLETE    CBC Auto Differential    TSH with Reflex    HCG, Quantitative, Pregnancy    Protime-INR    APTT   7. Dysmenorrhea  US NON OB TRANSVAGINAL    US PELVIS COMPLETE          Chief Complaint   Patient presents with    Annual Exam          Past Medical History:   Diagnosis Date    Abnormal Pap smear of cervix     follows with Linda    Anxiety     Asthma     physically induced    Flores's esophagus 02/05/2021    Depression     teen    Endometriosis     hx.     Erosive gastritis 02/05/2021    Esophageal dysphagia     Family history of colon cancer 01/21/2021    Maternal grandfather    GERD (gastroesophageal reflux disease)     Hematemesis/vomiting blood     x1    Nausea and vomiting     Rectal bleeding     Unintended weight loss          Patient Active Problem List    Diagnosis Date Noted    Flores's esophagus 02/05/2021    Erosive gastritis 02/05/2021    Hematemesis/vomiting blood      x1      Rectal bleeding     Family history of colon cancer 01/21/2021     Maternal grandfather      Weight loss 01/21/2021    Esophageal dysphagia 01/21/2021    GERD (gastroesophageal reflux disease)     Nausea and vomiting     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 Tobacco & Secondary smoke risks reviewed; instructed on cessation and avoidance      Counseling Completed:  Preventative Health Recommendations and Follow up. The patient was informed of the recommended preventative health recommendations. 1. Annuals every year; Cytology collections per prevailing guidelines. 2. Mammograms begin every year at 35 yo if no abnormalities are found and no family history. 3. Bone density studies every 2-3 years. Begin at 73 yo. If no fracture history or osteoporosis family history. (significant). 4. Colonoscopy begin at 38 yo. Repeat every ten years if negative and no family history. 5. Calcium of 9920-0858 mg/day in split dosing  6. Vitamin D 400-800 IU/day  7. All other preventative health recommendations will be managed by the patients Primary care physician.              PLAN:  Return in about 2 weeks (around 4/6/2021) for virtual visit/ DR Leung/ heavy, painful menses. Pap smear obtained. Pelvic ultrasound ordered. Lab slips given. Mammogram ordered. Repeat Annual every 1 year  Cervical Cytology Evaluation begins at 24years old. If Negative Cytology, Follow-up screening per current guidelines. Mammograms every 1 year. If 35 yo and last mammogram was negative. Calcium and Vitamin D dosing reviewed. Colonoscopy screening reviewed as well as onset for bone density testing. Birth control and barrier recommendations discussed. STD counseling and prevention reviewed. Gardisil counseling completed for all patients 10-35 yo. Routine health maintenance per patients PCP. Orders Placed This Encounter   Procedures    BULL DIGITAL SCREEN W OR WO CAD BILATERAL     Standing Status:   Future     Standing Expiration Date:   9/22/2022     Order Specific Question:   Reason for exam:     Answer:   screening for malignant neoplasm of breast    US NON OB TRANSVAGINAL     Standing Status:   Future     Standing Expiration Date:   9/23/2021     Order Specific Question:   Reason for exam:     Answer:   heavy menses, painful menses    US PELVIS COMPLETE     Standing Status:   Future     Standing Expiration Date:   6/23/2021     Order Specific Question:   Reason for exam:     Answer:   heavy menses    PAP SMEAR     Patient History:    No LMP recorded. OBGYN Status: Having periods  Past Surgical History:  No date: ABDOMINAL EXPLORATION SURGERY      Comment:  for endometriosis. 02/05/2021: COLONOSCOPY; N/A      Comment:  COLONOSCOPY DIAGNOSTIC performed by Vijay Ocampo MD at               [facility]  No date: [place];  Right      Comment:  scope bursitis   No date: TUBAL LIGATION  02/05/2021: UPPER GASTROINTESTINAL ENDOSCOPY; N/A      Comment:  [facility]  No date: WISDOM TOOTH EXTRACTION    Problem List       Edg Problems Affecting Cytology    HPV in female    Overview Addendum 10/6/2020  3:00 PM by Meek Ozuna,      12/4/19 pap collected- negative with positive HR HPV OTHER (Negative   16,18)  2/26/2020 colposcopy with ECC performed (ECC is negative)  8/2020 collect pap-NS/Reschedule  10/6/2020 pap collected with co-test        Social History    Tobacco Use      Smoking status: Never Smoker      Smokeless tobacco: Never Used      Tobacco comment: Few cigarettes a long time ago       Standing Status:   Future     Standing Expiration Date:   9/22/2021     Order Specific Question:   Collection Type     Answer: Thin Prep     Order Specific Question:   Prior Abnormal Pap Test     Answer:   No     Order Specific Question:   Screening or Diagnostic     Answer:   Diagnostic     Order Specific Question:   HPV Requested? Answer:   Yes     Order Specific Question:   High Risk Patient     Answer:   N/A    CBC Auto Differential     Standing Status:   Future     Number of Occurrences:   1     Standing Expiration Date:   3/23/2022    TSH with Reflex     Standing Status:   Future     Number of Occurrences:   1     Standing Expiration Date:   3/23/2022    HCG, Quantitative, Pregnancy     Standing Status:   Future     Number of Occurrences:   1     Standing Expiration Date:   3/23/2022    Protime-INR     Standing Status:   Future     Number of Occurrences:   1     Standing Expiration Date:   3/23/2022     Order Specific Question:   Daily Coumadin Dose? Answer:   N    APTT     Standing Status:   Future     Number of Occurrences:   1     Standing Expiration Date:   3/23/2022     Order Specific Question:   Daily Heparin Dose? Answer:   N           The patient, Ann Ron is a [address] y.o. female, was seen with a total time spent of 20 minutes for the visit on this date of service by the E/M provider. The time component had both face to face and non face to face time spent in determining the total time component.   Counseling and education regarding her diagnosis listed below and her options regarding those diagnoses were also included in determining her time component. Diagnosis Orders   1. Well woman exam with routine gynecological exam     2. Screening for HPV (human papillomavirus)  PAP SMEAR   3. ASCUS with positive high risk HPV cervical  PAP SMEAR   4. Encounter for screening mammogram for malignant neoplasm of breast  BULL DIGITAL SCREEN W OR WO CAD BILATERAL   5. History of loop electrical excision procedure (LEEP)  PAP SMEAR   6. Menorrhagia with regular cycle  US NON OB TRANSVAGINAL    US PELVIS COMPLETE    CBC Auto Differential    TSH with Reflex    HCG, Quantitative, Pregnancy    Protime-INR    APTT   7. Dysmenorrhea  US NON OB TRANSVAGINAL    US PELVIS COMPLETE        The patient had her preventative health maintenance recommendations and follow-up reviewed with her at the completion of her visit.

## 2021-03-29 DIAGNOSIS — R87.610 ASCUS WITH POSITIVE HIGH RISK HPV CERVICAL: ICD-10-CM

## 2021-03-29 DIAGNOSIS — Z98.890 HISTORY OF LOOP ELECTRICAL EXCISION PROCEDURE (LEEP): ICD-10-CM

## 2021-03-29 DIAGNOSIS — Z11.51 SCREENING FOR HPV (HUMAN PAPILLOMAVIRUS): ICD-10-CM

## 2021-03-29 DIAGNOSIS — R87.810 ASCUS WITH POSITIVE HIGH RISK HPV CERVICAL: ICD-10-CM

## 2021-05-27 ENCOUNTER — OFFICE VISIT (OUTPATIENT)
Dept: GASTROENTEROLOGY | Age: 41
End: 2021-05-27
Payer: COMMERCIAL

## 2021-05-27 VITALS
SYSTOLIC BLOOD PRESSURE: 124 MMHG | HEIGHT: 62 IN | BODY MASS INDEX: 33.13 KG/M2 | WEIGHT: 180 LBS | DIASTOLIC BLOOD PRESSURE: 83 MMHG

## 2021-05-27 DIAGNOSIS — K22.70 BARRETT'S ESOPHAGUS WITHOUT DYSPLASIA: ICD-10-CM

## 2021-05-27 DIAGNOSIS — R11.11 NON-INTRACTABLE VOMITING WITHOUT NAUSEA, UNSPECIFIED VOMITING TYPE: Primary | ICD-10-CM

## 2021-05-27 DIAGNOSIS — K21.00 GASTROESOPHAGEAL REFLUX DISEASE WITH ESOPHAGITIS WITHOUT HEMORRHAGE: ICD-10-CM

## 2021-05-27 PROBLEM — R11.10 NON-INTRACTABLE VOMITING: Status: ACTIVE | Noted: 2021-05-27

## 2021-05-27 PROCEDURE — 99214 OFFICE O/P EST MOD 30 MIN: CPT | Performed by: INTERNAL MEDICINE

## 2021-05-27 RX ORDER — OMEPRAZOLE 20 MG/1
20 CAPSULE, DELAYED RELEASE ORAL
Qty: 180 CAPSULE | Refills: 1 | Status: SHIPPED | OUTPATIENT
Start: 2021-05-27 | End: 2022-02-15

## 2021-05-27 ASSESSMENT — ENCOUNTER SYMPTOMS
ANAL BLEEDING: 0
TROUBLE SWALLOWING: 1
VOICE CHANGE: 1
NAUSEA: 0
ABDOMINAL DISTENTION: 1
CONSTIPATION: 0
EYES NEGATIVE: 1
BLOOD IN STOOL: 0
VOMITING: 1
DIARRHEA: 0
RECTAL PAIN: 0
ABDOMINAL PAIN: 0
CHOKING: 1

## 2021-05-27 NOTE — PROGRESS NOTES
loss  Has no hematemesis    Denies smoking alcohol abuse illicit drug usage    Some mild abdominal bloating and cramping    No significant history for diabetes but she has significant family history for diabetes mellitus    Denies using any narcotic pain medications    Has been taking famotidine with some partial benefit of her reflux symptoms          HISTORY OF PRESENT ILLNESS: Edmund Lugo is a 41 y.o. female with a past history remarkable for , referred for evaluation of   Chief Complaint   Patient presents with    Follow-up     Patient is a colon/egd f/u    Emesis     Patient notes vomiting everyday. Patient notes trying to change her diet. Notes occurs mostly after dinner. Denies nausea. She notes slowing down, trying smaller bites. She notes the food sits in her throat. She notes her body seems like its rejecting her food and it projects out.  Other     Takes famotidine, notes helps with the GERD symptoms. Notes normal BM every morning. Notes increased headaches.  Dysphagia     Notes occasional dysphagia. Notes voice change. .    Past Medical,Family, and Social History reviewed and does contribute to the patient presenting condition. Patient's PMH/PSH,SH,PSYCH Hx, MEDs, ALLERGIES, and ROS were all reviewed and updated in the appropriate sections. PAST MEDICAL HISTORY:  Past Medical History:   Diagnosis Date    Abnormal Pap smear of cervix     follows with Linda    Anxiety     Asthma     physically induced    Flores's esophagus 02/05/2021    Depression     teen    Endometriosis     hx.     Erosive gastritis 02/05/2021    Esophageal dysphagia     Family history of colon cancer 01/21/2021    Maternal grandfather    GERD (gastroesophageal reflux disease)     Hematemesis/vomiting blood     x1    Nausea and vomiting     Rectal bleeding     Unintended weight loss        Past Surgical History:   Procedure Laterality Date    ABDOMINAL EXPLORATION SURGERY      for endometriosis.     COLONOSCOPY N/A 02/05/2021    COLONOSCOPY DIAGNOSTIC performed by Caio Liz MD at [facility]     scope bursitis     TUBAL LIGATION      UPPER GASTROINTESTINAL ENDOSCOPY N/A 02/05/2021    [facility]    WISDOM TOOTH EXTRACTION         CURRENT MEDICATIONS:    Current Outpatient Medications:     omeprazole (PRILOSEC) 20 MG delayed release capsule, Take 1 capsule by mouth 2 times daily (before meals), Disp: 180 capsule, Rfl: 1    famotidine (PEPCID) 20 MG tablet, Take 1 tablet by mouth 2 times daily, Disp: 180 tablet, Rfl: 1    cetirizine (ZYRTEC) 10 MG tablet, TAKE ONE TABLET BY MOUTH DAILY, Disp: 30 tablet, Rfl: 5    ibuprofen (ADVIL;MOTRIN) 600 MG tablet, TAKE ONE TABLET BY MOUTH EVERY 8 HOURS AS NEEDED FOR PAIN OR FEVER, Disp: 30 tablet, Rfl: 0    triamcinolone (NASACORT) 55 MCG/ACT nasal inhaler, 2 sprays by Nasal route daily, Disp: 1 Inhaler, Rfl: 0    albuterol sulfate HFA (PROAIR HFA) 108 (90 Base) MCG/ACT inhaler, Inhale 2 puffs into the lungs every 6 hours as needed for Wheezing, Disp: 1 Inhaler, Rfl: 3    ALLERGIES:   No Known Allergies    FAMILY HISTORY:       Problem Relation Age of Onset    Arthritis Mother     Diabetes Mother     High Cholesterol Mother     Arthritis Father     Diabetes Father     High Cholesterol Father     Arthritis Sister     Arthritis Brother     Arthritis Maternal Aunt     Arthritis Maternal Uncle     Arthritis Paternal Aunt     Arthritis Paternal Uncle     Arthritis Maternal Grandmother     Asthma Maternal Grandmother     Diabetes Maternal Grandmother     Heart Disease Maternal Grandmother         CHF    High Blood Pressure Maternal Grandmother     Anemia Maternal Grandfather     Arthritis Maternal Grandfather     Colon Cancer Maternal Grandfather     Prostate Cancer Maternal Grandfather     Cancer Maternal Grandfather         skin    High Blood Pressure Maternal Grandfather     Arthritis Paternal Grandmother     Cancer Paternal Grandmother         stomach    Stroke Paternal Grandmother     Asthma Maternal Cousin     Other Other         Possible hx of blood clots         SOCIAL HISTORY:   Social History     Socioeconomic History    Marital status:      Spouse name: Not on file    Number of children: Not on file    Years of education: Not on file    Highest education level: Not on file   Occupational History    Not on file   Tobacco Use    Smoking status: Never Smoker    Smokeless tobacco: Never Used    Tobacco comment: Few cigarettes a long time ago   Vaping Use    Vaping Use: Never used   Substance and Sexual Activity    Alcohol use: Yes     Comment: rare     Drug use: Never    Sexual activity: Not on file   Other Topics Concern    Not on file   Social History Narrative    Not on file     Social Determinants of Health     Financial Resource Strain:     Difficulty of Paying Living Expenses:    Food Insecurity:     Worried About Running Out of Food in the Last Year:     Ran Out of Food in the Last Year:    Transportation Needs:     Lack of Transportation (Medical):  Lack of Transportation (Non-Medical):    Physical Activity:     Days of Exercise per Week:     Minutes of Exercise per Session:    Stress:     Feeling of Stress :    Social Connections:     Frequency of Communication with Friends and Family:     Frequency of Social Gatherings with Friends and Family:     Attends Baptism Services:     Active Member of Clubs or Organizations:     Attends Club or Organization Meetings:     Marital Status:    Intimate Partner Violence:     Fear of Current or Ex-Partner:     Emotionally Abused:     Physically Abused:     Sexually Abused:          REVIEW OF SYSTEMS:         Review of Systems   Constitutional: Positive for appetite change (starving after sick, fill up fast) and fatigue (after vomiting). HENT: Positive for trouble swallowing and voice change.  Negative for congestion. Eyes: Negative. Respiratory: Positive for choking (when vomiting). Cardiovascular: Negative. Gastrointestinal: Positive for abdominal distention and vomiting (improved). Negative for abdominal pain, anal bleeding, blood in stool, constipation, diarrhea, nausea and rectal pain. Endocrine: Negative. Genitourinary: Negative. Musculoskeletal: Negative. Skin: Negative. Neurological: Positive for headaches. Hematological: Negative. Psychiatric/Behavioral: Negative. PHYSICAL EXAMINATION: Vital signs reviewed per the nursing documentation. /83   Ht 5' 2\" (1.575 m)   Wt 180 lb (81.6 kg)   BMI 32.92 kg/m²   Body mass index is 32.92 kg/m². Physical Exam  Nursing note reviewed. Constitutional:       Appearance: She is well-developed. Comments: Anxious    HENT:      Head: Normocephalic and atraumatic. Eyes:      Conjunctiva/sclera: Conjunctivae normal.      Pupils: Pupils are equal, round, and reactive to light. Cardiovascular:      Heart sounds: Normal heart sounds. Pulmonary:      Effort: Pulmonary effort is normal.      Breath sounds: Normal breath sounds. Abdominal:      General: Bowel sounds are normal.      Palpations: Abdomen is soft. Comments: NON TENDER, NON DISTENTED  LIVER SPLEEN AND HERNIAS ARE NOT  PALPABLE  BOWEL SOUNDS ARE POSITIVE      Musculoskeletal:         General: Normal range of motion. Cervical back: Normal range of motion and neck supple. Skin:     General: Skin is warm. Neurological:      Mental Status: She is alert and oriented to person, place, and time.    Psychiatric:         Behavior: Behavior normal.           LABORATORY DATA: Reviewed  Lab Results   Component Value Date    WBC 13.1 (H) 03/23/2021    HGB 12.6 03/23/2021    HCT 39.8 03/23/2021    MCV 92.1 03/23/2021     03/23/2021     03/06/2018    K 4.2 03/06/2018     03/06/2018    CO2 23 03/06/2018    BUN 10 03/06/2018    CREATININE 0.49 (L) 03/06/2018    LABALBU 4.3 03/06/2018    BILITOT 0.15 (L) 03/06/2018    ALKPHOS 61 03/06/2018    AST 20 03/06/2018    ALT 18 03/06/2018    INR 0.9 03/23/2021         Lab Results   Component Value Date    RBC 4.32 03/23/2021    HGB 12.6 03/23/2021    MCV 92.1 03/23/2021    MCH 29.2 03/23/2021    MCHC 31.7 03/23/2021    RDW 12.3 03/23/2021    MPV 10.9 03/23/2021    BASOPCT 0 03/23/2021    LYMPHSABS 4.07 (H) 03/23/2021    MONOSABS 0.82 03/23/2021    NEUTROABS 7.98 03/23/2021    EOSABS 0.14 03/23/2021    BASOSABS 0.05 03/23/2021         DIAGNOSTIC TESTING:     No results found. Assessment  1. Non-intractable vomiting without nausea, unspecified vomiting type    2. Flores's esophagus without dysplasia    3. Gastroesophageal reflux disease with esophagitis without hemorrhage        Plan    Trial of proton pump inhibitor therapy 20 mg twice daily for 2 weeks then every morning before breakfast instruction were given how to use it and prescription sent to her pharmacy        Pt was discussed in detail about the possible side effects of proton pump inhibiter therapy. She was explained about the possibility of calcium and magnesium malabsorption and was advised to start taking calcium supplements with Vit D. Some over the counter regimens were explained to patient. Some dietary advices were also given. She has verbalized understanding and agreement to this. Gastric emptying scan    MRCP    Small frequent meals    Pt was advised in detail about some life style and dietary modifications. She was advised about avoidance of caffeine, nicotine and chocolate. Pt was also told to stay away from any kind of fast foods, soda pops. She was also advised to avoid lots of spices, grease and fried food etc.     Instructions were also given about trying to arrange the timing, quality and quantity of food.     Instructions were given about using ample amount of fiber including dietary and supplemental fiber either metamucil, bennafiber or citrucell etc.  Pt was advised about drinking ample amount of water without any colors or chemicals. Stress was given about regular exercise. Pt has verbalized understanding and agreement to these modifications. Pt seems to have signs and symptoms consistent with GERD, acid indigestion and heartburns. She was discussed  in detail about some possible life style and dietary modifications. She was stressed about the maintenance  of appropriate weight and effect of obesity contributing to reflux symptoms. Routine exercise was streesed. Avoidance of Caffeine, nicotine and chocolate were explained. Pt was asked to avoid spices grease and fried food. Advices were also given about avoidance of any kind of fast foods, soda pops and high energy drinks. Pt was advised to place two small block under the head end of the bed which may help with night time reflux. Was advised not to eat any thin at least 2-3 hrs before going to bed and walk especially after dinner    Pt has verbalized understanding and agreement to this plan. See her back in few weeks to determine if any further work-up is needed depending on her symptoms and findings of the above testing    She was asked to call me if there is any problem issues or things change      I communicated with the patient and/or health care decision maker about   Details of this discussion including any medical advice provided:YES      I affirm this is a Patient Initiated Episode with an Established Patient who has not had a related appointment within my department in the past 7 days or scheduled within the next 24 hours. Total Time: minutes: 21-30 minutes    Note: not billable if this call serves to triage the patient into an appointment for the relevant concern      Thank you for allowing me to participate in the care of Ms. Pena. For any further questions please do not hesitate to contact me.     I have reviewed and agree with the ROS entered by the MA/LPMARK.          Dania Lewis MD, [facility]  Board Certified in Gastroenterology and [address] [place] [address] Gastroenterology  Office #: [phone]

## 2021-06-22 ENCOUNTER — TELEPHONE (OUTPATIENT)
Dept: GASTROENTEROLOGY | Age: 41
End: 2021-06-22

## 2021-06-22 NOTE — TELEPHONE ENCOUNTER
Writer attempted to call patient to remind her about the imagining orders that Dr. Naya Sánchez had placed for her and to remind her of her upcoming appointment with us 7/19. Writer unable to speak with patient directly or able to leave a message. Phone was picked up however could hear patient and another having a conversation in the back ground. Writer said hello and few times no response. Writer then ended the call and sent patient a message via [employer]. Will reach back to patient later if writer sees imagining isn't scheduled yet.

## 2021-07-22 ENCOUNTER — HOSPITAL ENCOUNTER (OUTPATIENT)
Dept: WOMENS IMAGING | Age: 41
Discharge: HOME | End: 2021-07-24
Payer: COMMERCIAL

## 2021-07-22 DIAGNOSIS — N94.6 DYSMENORRHEA: ICD-10-CM

## 2021-07-22 DIAGNOSIS — N92.0 MENORRHAGIA WITH REGULAR CYCLE: ICD-10-CM

## 2021-07-22 PROCEDURE — 76856 US EXAM PELVIC COMPLETE: CPT

## 2021-07-22 PROCEDURE — 76830 TRANSVAGINAL US NON-OB: CPT

## 2022-01-24 ENCOUNTER — OFFICE VISIT (OUTPATIENT)
Dept: FAMILY MEDICINE CLINIC | Age: 42
End: 2022-01-24
Payer: COMMERCIAL

## 2022-01-24 VITALS
HEART RATE: 90 BPM | SYSTOLIC BLOOD PRESSURE: 128 MMHG | OXYGEN SATURATION: 96 % | TEMPERATURE: 97.5 F | DIASTOLIC BLOOD PRESSURE: 84 MMHG

## 2022-01-24 DIAGNOSIS — J02.0 STREP THROAT: Primary | ICD-10-CM

## 2022-01-24 DIAGNOSIS — J02.9 SORE THROAT: ICD-10-CM

## 2022-01-24 LAB — S PYO AG THROAT QL: POSITIVE

## 2022-01-24 PROCEDURE — 99213 OFFICE O/P EST LOW 20 MIN: CPT | Performed by: NURSE PRACTITIONER

## 2022-01-24 PROCEDURE — 87880 STREP A ASSAY W/OPTIC: CPT | Performed by: NURSE PRACTITIONER

## 2022-01-24 RX ORDER — AMOXICILLIN 500 MG/1
500 CAPSULE ORAL 2 TIMES DAILY
Qty: 20 CAPSULE | Refills: 0 | Status: SHIPPED | OUTPATIENT
Start: 2022-01-24 | End: 2022-02-03

## 2022-01-24 ASSESSMENT — ENCOUNTER SYMPTOMS
SORE THROAT: 1
VOMITING: 0
NAUSEA: 0
COUGH: 0
CHANGE IN BOWEL HABIT: 0

## 2022-01-24 NOTE — PROGRESS NOTES
[address] [facility] [address]  Dept: [phone]  Dept Fax: [phone]    Minor Homans is a 35 y.o. female who presents to the urgent care today for her medical conditions/complaints as notedbelow. Minor Homans is c/o of Pharyngitis (onset since Thursday ) and Nasal Congestion      HPI:     35 yr old female presents for possible strep throat. C/o st and slight PND sensation, no cough or uri sx. Sx x4d  Covid Vaccinated? 3 doses  Hx asthma  Took home covid test and was neg  Pt has no concern for covid    Pharyngitis  This is a new problem. The current episode started in the past 7 days (x4d). Associated symptoms include congestion (nasal - minimal ) and a sore throat. Pertinent negatives include no change in bowel habit, coughing, fever, myalgias, nausea or vomiting. The symptoms are aggravated by swallowing. She has tried NSAIDs (mucinex, tea) for the symptoms. The treatment provided mild relief. Past Medical History:   Diagnosis Date    Abnormal Pap smear of cervix     follows with Linda    Anxiety     Asthma     physically induced    Flores's esophagus 02/05/2021    Depression     teen    Endometriosis     hx.     Erosive gastritis 02/05/2021    Esophageal dysphagia     Family history of colon cancer 01/21/2021    Maternal grandfather    GERD (gastroesophageal reflux disease)     Hematemesis/vomiting blood     x1    Nausea and vomiting     Rectal bleeding     Unintended weight loss         Current Outpatient Medications   Medication Sig Dispense Refill    amoxicillin (AMOXIL) 500 MG capsule Take 1 capsule by mouth 2 times daily for 10 days 20 capsule 0    omeprazole (PRILOSEC) 20 MG delayed release capsule Take 1 capsule by mouth 2 times daily (before meals) 180 capsule 1    famotidine (PEPCID) 20 MG tablet Take 1 tablet by mouth 2 times daily 180 Cardiovascular:      Rate and Rhythm: Normal rate and regular rhythm. Pulses: Normal pulses. Heart sounds: Normal heart sounds. No murmur heard. Pulmonary:      Effort: Pulmonary effort is normal. No respiratory distress. Breath sounds: Normal breath sounds. No stridor. No wheezing, rhonchi or rales. Abdominal:      General: Bowel sounds are normal. There is no distension. Palpations: Abdomen is soft. Tenderness: There is no abdominal tenderness. Musculoskeletal:         General: Normal range of motion. Cervical back: Normal range of motion and neck supple. Comments: Ambulated to and from room, gait steady, moving all ext without difficulty   Lymphadenopathy:      Cervical: Cervical adenopathy ( + tender kamran ant cervical lymphadenopathy) present. Skin:     General: Skin is warm and dry. Capillary Refill: Capillary refill takes less than 2 seconds. Findings: No rash ( no rash to visible skin). Neurological:      General: No focal deficit present. Mental Status: She is alert and oriented to person, place, and time. Motor: No abnormal muscle tone. Coordination: Coordination normal.   Psychiatric:         Behavior: Behavior normal.       /84 (Site: Left Upper Arm, Position: Sitting, Cuff Size: Large Adult)   Pulse 90   Temp 97.5 °F (36.4 °C) (Tympanic)   SpO2 96%     Assessment:       Diagnosis Orders   1. Strep throat  POCT rapid strep A   2. Sore throat  POCT rapid strep A       Plan:      Results for POC orders placed in visit on 01/24/22   POCT rapid strep A   Result Value Ref Range    Strep A Ag Positive (A) None Detected         POCT strep +  No concern for covid - pt declines covid test at this time - no other uri sx  Amoxil rx  Throw toothbrush away 24 hours after starting antibiotic  Return if symptoms worsen or fail to improve, for Make an Appt. with your Primary Care in 1 week.     Orders Placed This Encounter   Medications    amoxicillin (AMOXIL) 500 MG capsule     Sig: Take 1 capsule by mouth 2 times daily for 10 days     Dispense:  20 capsule     Refill:  0         Patient given educational materials - see patient instructions. Discussed use, benefit, and side effects of prescribed medications. All patient questions answered. Pt voicedunderstanding.     Electronically signed by BRENDON Coronado CNP on 1/24/2022 at 9:39 AM

## 2022-01-24 NOTE — PATIENT INSTRUCTIONS
Follow up with family doctor in 1 week as needed. Return immediately if worse, new symptoms develop, symptoms persist or have any questions or concerns. Push fluids, keep hydrated  Cool mist humidifier bedside  Continue all medications as prescribed  May alternate tylenol/motrin over the counter for pain or fever, take per package instructions. Patient Education        Strep Throat: Care Instructions  Your Care Instructions     Strep throat is a bacterial infection that causes sudden, severe sore throat and fever. Strep throat, which is caused by bacteria called streptococcus, is treated with antibiotics. Sometimes a strep test is necessary to tell if the sore throat is caused by strep bacteria. Treatment can help ease symptoms and may prevent future problems. Follow-up care is a key part of your treatment and safety. Be sure to make and go to all appointments, and call your doctor if you are having problems. It's also a good idea to know your test results and keep a list of the medicines you take. How can you care for yourself at home? · Take your antibiotics as directed. Do not stop taking them just because you feel better. You need to take the full course of antibiotics. · Strep throat can spread to others until 24 hours after you begin taking antibiotics. During this time, avoid contact with other people at work, school, or home, especially infants and children. Do not sneeze or cough on others, and wash your hands often. Keep your drinking glass and eating utensils separate from those of others. Wash these items well in hot, soapy water. · Gargle with warm salt water at least once each hour to help reduce swelling and make your throat feel better. Use 1 teaspoon of salt mixed in 8 fluid ounces of warm water. · Take an over-the-counter pain medication, such as acetaminophen (Tylenol), ibuprofen (Advil, Motrin), or naproxen (Aleve). Read and follow all instructions on the label.   · Try an over-the-counter anesthetic throat spray or throat lozenges, which may help relieve throat pain. · Drink plenty of fluids. Fluids may help soothe an irritated throat. Hot fluids, such as tea or soup, may help your throat feel better. · Eat soft solids and drink plenty of clear liquids. Flavored ice pops, ice cream, scrambled eggs, sherbet, and gelatin dessert (such as Jell-O) may also soothe the throat. · Get lots of rest.  · Do not smoke, and avoid secondhand smoke. If you need help quitting, talk to your doctor about stop-smoking programs and medicines. These can increase your chances of quitting for good. · Use a vaporizer or humidifier to add moisture to the air in your bedroom. Follow the directions for cleaning the machine. When should you call for help? Call your doctor now or seek immediate medical care if:    · You have a new or higher fever.     · You have a fever with a stiff neck or severe headache.     · You have new or worse trouble swallowing.     · Your sore throat gets much worse on one side.     · Your pain becomes much worse on one side of your throat. Watch closely for changes in your health, and be sure to contact your doctor if:    · You are not getting better after 2 days (48 hours).     · You do not get better as expected. Where can you learn more? Go to [email or web address] and sign in to your Mobile Captain account. Enter K625 in the [facility] box to learn more about \"Strep Throat: Care Instructions. \"     If you do not have an account, please click on the \"Sign Up Now\" link. Current as of: September 8, 2021               Content Version: 13.1  © 0110-7051 [employer]wise, [employer]. Care instructions adapted under license by [facility] ([facility]). If you have questions about a medical condition or this instruction, always ask your healthcare professional. [address] any warranty or liability for your use of this information.

## 2022-02-15 ENCOUNTER — OFFICE VISIT (OUTPATIENT)
Dept: OBGYN CLINIC | Age: 42
End: 2022-02-15
Payer: COMMERCIAL

## 2022-02-15 VITALS
HEIGHT: 62 IN | BODY MASS INDEX: 32.94 KG/M2 | WEIGHT: 179 LBS | DIASTOLIC BLOOD PRESSURE: 66 MMHG | SYSTOLIC BLOOD PRESSURE: 120 MMHG

## 2022-02-15 DIAGNOSIS — Z86.19 H/O HUMAN PAPILLOMAVIRUS INFECTION: ICD-10-CM

## 2022-02-15 DIAGNOSIS — Z98.890 H/O LEEP: ICD-10-CM

## 2022-02-15 DIAGNOSIS — Z01.419 WELL WOMAN EXAM: Primary | ICD-10-CM

## 2022-02-15 DIAGNOSIS — Z12.31 ENCOUNTER FOR SCREENING MAMMOGRAM FOR MALIGNANT NEOPLASM OF BREAST: ICD-10-CM

## 2022-02-15 DIAGNOSIS — Z98.51 H/O TUBAL LIGATION: ICD-10-CM

## 2022-02-15 PROCEDURE — 99396 PREV VISIT EST AGE 40-64: CPT | Performed by: NURSE PRACTITIONER

## 2022-02-15 ASSESSMENT — PATIENT HEALTH QUESTIONNAIRE - PHQ9
1. LITTLE INTEREST OR PLEASURE IN DOING THINGS: 0
SUM OF ALL RESPONSES TO PHQ QUESTIONS 1-9: 0
SUM OF ALL RESPONSES TO PHQ9 QUESTIONS 1 & 2: 0
SUM OF ALL RESPONSES TO PHQ QUESTIONS 1-9: 0
2. FEELING DOWN, DEPRESSED OR HOPELESS: 0

## 2022-02-15 NOTE — PROGRESS NOTES
History and Physical  [address], [address] [facility] [phone]   Fax [phone]  Marium De Luna  2/15/2022              35 y.o. Chief Complaint   Patient presents with    Gynecologic Exam       Patient's last menstrual period was 2022 (approximate). Primary Care Physician: Mira Falk, BRENDON - CNP    The patient was seen and examined. She has no chief complaint today and is here for her annual exam.  Her bowels are regular. There are no voiding complaints. She denies any bloating. She denies vaginal discharge and was counseled on STD's and the need for barrier contraception. HPI : Marium De Luna is a 35 y.o. female     Annual exam  Periods are regular occurring every month. Hx of endometriosis- diagnosed in her teens by laparoscopic surgery with Dr Ilia Goncalves. Periods are sometimes painful -second day is the worst.  Does not take ibuprofen. \"not a medicine person\". Patient reports has high pain tolerance. Declines follow up with physician. Hx of LEEP   Hx of TL.  ________________________________________________________________________  OB History    Para Term  AB Living   2 2 2 0 0 2   SAB IAB Ectopic Molar Multiple Live Births   0 0 0 0 0 0      # Outcome Date GA Lbr Geovani/2nd Weight Sex Delivery Anes PTL Lv   2 Term            1 Term              Past Medical History:   Diagnosis Date    Abnormal Pap smear of cervix     follows with Linda    Anxiety     Asthma     physically induced    Flores's esophagus 2021    Depression     teen    Endometriosis     hx.     Erosive gastritis 2021    Esophageal dysphagia     Family history of colon cancer 2021    Maternal grandfather    GERD (gastroesophageal reflux disease)     Hematemesis/vomiting blood     x1    Nausea and vomiting     Rectal bleeding     Unintended weight loss Past Surgical History:   Procedure Laterality Date    ABDOMINAL EXPLORATION SURGERY      for endometriosis.     COLONOSCOPY N/A 02/05/2021    COLONOSCOPY DIAGNOSTIC performed by Doron Pereyra MD at [facility] Right     scope bursitis     TUBAL LIGATION      UPPER GASTROINTESTINAL ENDOSCOPY N/A 02/05/2021    [facility]    WISDOM TOOTH EXTRACTION       Family History   Problem Relation Age of Onset    Arthritis Mother     Diabetes Mother     High Cholesterol Mother     Arthritis Father     Diabetes Father     High Cholesterol Father     Arthritis Sister     Arthritis Brother     Arthritis Maternal Aunt     Arthritis Maternal Uncle     Arthritis Paternal Aunt     Arthritis Paternal Uncle     Arthritis Maternal Grandmother     Asthma Maternal Grandmother     Diabetes Maternal Grandmother     Heart Disease Maternal Grandmother         CHF    High Blood Pressure Maternal Grandmother     Anemia Maternal Grandfather     Arthritis Maternal Grandfather     Colon Cancer Maternal Grandfather     Prostate Cancer Maternal Grandfather     Cancer Maternal Grandfather         skin    High Blood Pressure Maternal Grandfather     Arthritis Paternal Grandmother     Cancer Paternal Grandmother         stomach    Stroke Paternal Grandmother     Asthma Maternal Cousin     Other Other         Possible hx of blood clots     Social History     Socioeconomic History    Marital status:      Spouse name: Not on file    Number of children: Not on file    Years of education: Not on file    Highest education level: Not on file   Occupational History    Not on file   Tobacco Use    Smoking status: Never Smoker    Smokeless tobacco: Never Used    Tobacco comment: Few cigarettes a long time ago   Vaping Use    Vaping Use: Never used   Substance and Sexual Activity    Alcohol use: Yes     Comment: rare     Drug use: Never    Sexual activity: Not on file Other Topics Concern    Not on file   Social History Narrative    Not on file     Social Determinants of Health     Financial Resource Strain:     Difficulty of Paying Living Expenses: Not on file   Food Insecurity:     Worried About Running Out of Food in the Last Year: Not on file    Beto of Food in the Last Year: Not on file   Transportation Needs:     Lack of Transportation (Medical): Not on file    Lack of Transportation (Non-Medical): Not on file   Physical Activity:     Days of Exercise per Week: Not on file    Minutes of Exercise per Session: Not on file   Stress:     Feeling of Stress : Not on file   Social Connections:     Frequency of Communication with Friends and Family: Not on file    Frequency of Social Gatherings with Friends and Family: Not on file    Attends Anabaptist Services: Not on file    Active Member of [address] [place] [address] or Organizations: Not on file    Attends Club or Organization Meetings: Not on file    Marital Status: Not on file   Intimate Partner Violence:     Fear of Current or Ex-Partner: Not on file    Emotionally Abused: Not on file    Physically Abused: Not on file    Sexually Abused: Not on file   Housing Stability:     Unable to Pay for Housing in the Last Year: Not on file    Number of [place] in the Last Year: Not on file    Unstable Housing in the Last Year: Not on file       MEDICATIONS:  Current Outpatient Medications   Medication Sig Dispense Refill    cetirizine (ZYRTEC) 10 MG tablet TAKE ONE TABLET BY MOUTH DAILY 30 tablet 5    ibuprofen (ADVIL;MOTRIN) 600 MG tablet TAKE ONE TABLET BY MOUTH EVERY 8 HOURS AS NEEDED FOR PAIN OR FEVER 30 tablet 0    triamcinolone (NASACORT) 55 MCG/ACT nasal inhaler 2 sprays by Nasal route daily 1 Inhaler 0    albuterol sulfate HFA (PROAIR HFA) 108 (90 Base) MCG/ACT inhaler Inhale 2 puffs into the lungs every 6 hours as needed for Wheezing 1 Inhaler 3     No current facility-administered medications for this visit. ALLERGIES:  Allergies as of 02/15/2022    (No Known Allergies)       Symptoms of decreased mood absent  Symptoms of anhedonia absent    **If either question is answered in a  positive fashion then complete the PHQ9 Scoring Evaluation and make the appropriate referral**      Immunization status: stated as current, but no records available. Gynecologic History:  Menarche: 13 yo  Menopause at [address] yo     Patient's last menstrual period was 02/09/2022 (approximate). Sexually Active: Yes    STD History: No     Permanent Sterilization: Yes TL   Reversible Birth Control: No        Hormone Replacement Exposure: No      Genetic Qualified Family History of Breast, Ovarian , Colon or Uterine Cancer: No (maternal grandfather with colon cancer - 62s)     If YES see scanned worksheet. Preventative Health Testing:    Health Maintenance:  Health Maintenance Due   Topic Date Due    Hepatitis C screen  Never done    HIV screen  Never done    Diabetes screen  02/09/2021    COVID-19 Vaccine (3 - Booster for Moderna series) 06/27/2021    Flu vaccine (1) Never done       Date of Last Pap Smear: 3/29/2021 neg/neg  Abnormal Pap Smear History: yes, Hx of LEEP  Colposcopy History: 2020  Date of Last Mammogram: 12/3/2020 negative  Date of Last Colonoscopy: Feb 2021- normal- polyps- DR Erika Tripp  Date of Last Bone Density:      ________________________________________________________________________        REVIEW OF SYSTEMS:    yes   A minimum of an eleven point review of systems was completed. Review Of Systems (11 point):  Constitutional: No fever, chills or malaise;  No weight change or fatigue  Head and Eyes: No vision changes, Headache, Dizziness or trauma in last 12 months  ENT ROS: No hearing, Tinnitis, sinus or taste problems  Hematological and Lymphatic ROS:No Lymphoma, Von Willebrand's, Hemophillia or Bleeding History  Psych ROS: No Depression, Homicidal thoughts,suicidal thoughts, or anxiety  Breast ROS: No breast abnormalities or lumps  Respiratory ROS: No SOB, Pneumoniae,Cough, or Pulmonary Embolism   Cardiovascular ROS: No Chest Pain with Exertion, Palpitations, Syncope, Edema, Arrhythmia  Gastrointestinal ROS: No Indigestion, Heartburn, Nausea, vomiting, Diarrhea, Constipation,or Bowel Changes; No Bloody Stools or melena  Genito-Urinary ROS: No Dysuria, Hematuria or Nocturia. No Urinary Incontinence or Vaginal Discharge  Musculoskeletal ROS: No Arthralgia, Arthritis,Gout,Osteoporosis or Rheumatism  Neurological ROS: No CVA, Migraines, Epilepsy, Seizure Hx, or Limb Weakness  Dermatological ROS: No Rash, Itching, Hives, Mole Changes or Cancer                                                                                                                                                                                                                                  PHYSICAL Exam:     Constitutional:  Vitals:    02/15/22 1236   BP: 120/66   Site: Right Upper Arm   Position: Sitting   Cuff Size: Medium Adult   Weight: 179 lb (81.2 kg)   Height: 5' 2\" (1.575 m)       Chaperone for Intimate Exam   Chaperone was offered and accepted as part of the rooming process.  Chaperone: Lisa Ahmadi Appearance: This  is a well Developed, well Nourished, well groomed female. Her BMI was reviewed. Nutritional decision making was discussed. Skin:  There was a Normal Inspection of the skin without rashes or lesions. There were no rashes. (Papular, Maculopapular, Hives, Pustular, Macular)     There were no lesions (Ulcers, Erythema, Abn. Appearing Nevi)            Lymphatic:  No Lymph Nodes were Palpable in the neck , axilla or groin.  0 # Of Lymph Nodes; Location ; Character [Normal]  [Shotty] [Tender] [Enlarged]     Neck and EENT:  The neck was supple. There were no masses   The thyroid was not enlarged and had no masses. Perrla, EOMI B/L, TMI B/L No Abnormalities.    Throat inspected-No exudates or Masses, Nares Patent No Masses        Respiratory: The lungs were auscultated and found to be clear. There were no rales, rhonchi or wheezes. There was a good respiratory effort. Cardiovascular: The heart was in a regular rate and rhythm. . No S3 or S4. There was no murmur appreciated. Location, grade, and radiation are not applicable. Extremities: The patients extremities were without calf tenderness, edema, or varicosities. There was full range of motion in all four extremities. Pulses in all four extremities were appreciated and are 2/4. Abdomen: The abdomen was soft and non-tender. There were good bowel sounds in all quadrants and there was no guarding, rebound or rigidity. On evaluation there was no evidence of hepatosplenomegaly and there was no costal vertebral taylor tenderness bilaterally. No hernias were appreciated. Abdominal Scars: lap scars    Psych: The patient had a normal Orientation to: Time, Place, Person, and Situation  There is no Mood / Affect changes    Breast:  (Chest)  normal appearance, no masses or tenderness, No nipple retraction or dimpling, No nipple discharge or bleeding, No axillary or supraclavicular adenopathy, Normal to palpation without dominant masses  Self breast exams were reviewed in detail. Literature was given. Pelvic Exam:  External genitalia: normal general appearance  Urinary system: urethral meatus normal  Vaginal: normal mucosa without prolapse or lesions  Cervix: normal appearance  Adnexa: normal bimanual exam  Uterus: normal single, nontender    Rectal Exam:  exam declined by patient          Musculosk:  Normal Gait and station was noted. Digits were evaluated without abnormal findings. Range of motion, stability and strength were evaluated and found to be appropriate for the patients age. ASSESSMENT:      35 y.o. Annual   Diagnosis Orders   1. Well woman exam  PAP SMEAR   2.  Encounter for screening mammogram for malignant neoplasm of breast  BULL DIGITAL SCREEN W OR WO CAD BILATERAL   3. H/O LEEP  PAP SMEAR   4. H/O tubal ligation     5. H/O human papillomavirus infection  PAP SMEAR          Chief Complaint   Patient presents with    Gynecologic Exam          Past Medical History:   Diagnosis Date    Abnormal Pap smear of cervix     follows with Linda    Anxiety     Asthma     physically induced    Flores's esophagus 02/05/2021    Depression     teen    Endometriosis     hx.  Erosive gastritis 02/05/2021    Esophageal dysphagia     Family history of colon cancer 01/21/2021    Maternal grandfather    GERD (gastroesophageal reflux disease)     Hematemesis/vomiting blood     x1    Nausea and vomiting     Rectal bleeding     Unintended weight loss          Patient Active Problem List    Diagnosis Date Noted    H/O LEEP 02/15/2022     Priority: High    H/O tubal ligation 02/15/2022     Priority: High    Non-intractable vomiting 05/27/2021    Gastroesophageal reflux disease with esophagitis without hemorrhage 05/27/2021    Flores's esophagus 02/05/2021    Erosive gastritis 02/05/2021    Hematemesis/vomiting blood      x1      Rectal bleeding     Family history of colon cancer 01/21/2021     Maternal grandfather      Weight loss 01/21/2021    Esophageal dysphagia 01/21/2021    GERD (gastroesophageal reflux disease)     Nausea and vomiting     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           Tobacco & Secondary smoke risks reviewed; instructed on cessation and avoidance      Counseling Completed:  Preventative Health Recommendations and Follow up. The patient was informed of the recommended preventative health recommendations. 1. Annuals every year; Cytology collections per prevailing guidelines. 2. Mammograms begin every year at 35 yo if no abnormalities are found and no family history. 3. Bone density studies every 2-3 years. Begin at 73 yo. If no fracture history or osteoporosis family history. (significant). 4. Colonoscopy begin at 38 yo. Repeat every ten years if negative and no family history. 5. Calcium of 6936-2016 mg/day in split dosing  6. Vitamin D 400-800 IU/day  7. All other preventative health recommendations will be managed by the patients Primary care physician. PLAN:  Return in about 1 year (around 2/15/2023) for annual.   Pap smear obtained. Mammogram ordered. Repeat Annual every 1 year  Cervical Cytology Evaluation begins at 24years old. If Negative Cytology, Follow-up screening per current guidelines. Mammograms every 1 year. If 35 yo and last mammogram was negative. Calcium and Vitamin D dosing reviewed. Colonoscopy screening reviewed as well as onset for bone density testing. Birth control and barrier recommendations discussed. STD counseling and prevention reviewed. Gardisil counseling completed for all patients 10-35 yo. Routine health maintenance per patients PCP. Orders Placed This Encounter   Procedures    BULL DIGITAL SCREEN W OR WO CAD BILATERAL     Standing Status:   Future     Standing Expiration Date:   4/14/2023     Order Specific Question:   Reason for exam:     Answer:   screening mammogram    PAP SMEAR     Patient History:    Patient's last menstrual period was 02/09/2022 (approximate). OBGYN Status: Having periods  Past Surgical History:  No date: ABDOMINAL EXPLORATION SURGERY      Comment:  for endometriosis.   02/05/2021: COLONOSCOPY; N/A      Comment:  COLONOSCOPY DIAGNOSTIC performed by Glenna Coronado MD at               [facility]  No date: [place]; Right      Comment:  scope bursitis   No date: TUBAL LIGATION  02/05/2021: UPPER GASTROINTESTINAL ENDOSCOPY; N/A      Comment:  [facility]  No date: WISDOM TOOTH EXTRACTION    Problem List       Edg Problems Affecting Cytology    HPV in female    Overview Addendum 10/6/2020  3:00 PM by Hemanth Bustillos,      12/4/19 pap collected- negative with positive HR HPV OTHER (Negative   16,18)  2/26/2020 colposcopy with ECC performed (ECC is negative)  8/2020 collect pap-NS/Reschedule  10/6/2020 pap collected with co-test        Social History    Tobacco Use      Smoking status: Never Smoker      Smokeless tobacco: Never Used      Tobacco comment: Few cigarettes a long time ago       Standing Status:   Future     Standing Expiration Date:   2/15/2023     Order Specific Question:   Collection Type     Answer: Thin Prep     Order Specific Question:   Prior Abnormal Pap Test     Answer:   No     Order Specific Question:   Screening or Diagnostic     Answer:   Screening     Order Specific Question:   HPV Requested? Answer:   Yes     Order Specific Question:   High Risk Patient     Answer:   N/A           The patient, Fabian Sparks is a 35 y.o. female, was seen with a total time spent of 20 minutes for the visit on this date of service by the E/M provider. The time component had both face to face and non face to face time spent in determining the total time component. Counseling and education regarding her diagnosis listed below and her options regarding those diagnoses were also included in determining her time component. Diagnosis Orders   1. Well woman exam  PAP SMEAR   2. Encounter for screening mammogram for malignant neoplasm of breast  BULL DIGITAL SCREEN W OR WO CAD BILATERAL   3. H/O LEEP  PAP SMEAR   4. H/O tubal ligation     5.  H/O human papillomavirus infection  PAP SMEAR        The patient had her preventative health maintenance recommendations and follow-up reviewed with her at the completion of her visit.

## 2022-02-17 DIAGNOSIS — Z98.890 H/O LEEP: ICD-10-CM

## 2022-02-17 DIAGNOSIS — Z86.19 H/O HUMAN PAPILLOMAVIRUS INFECTION: ICD-10-CM

## 2022-02-17 DIAGNOSIS — Z01.419 WELL WOMAN EXAM: ICD-10-CM

## 2022-04-19 ENCOUNTER — NURSE TRIAGE (OUTPATIENT)
Dept: OTHER | Facility: CLINIC | Age: 42
End: 2022-04-19

## 2022-04-19 NOTE — TELEPHONE ENCOUNTER
Received call from Mulu Lopez at [facility] with [employer]. Subjective: Caller states \"Calf pain\"     Current Symptoms: Left calf pain, noticed a knot. \"Felt like a pop and burning sensation\"    Onset: 1 week ago; gradual    Associated Symptoms: NA    Pain Severity: 5/10; sharp, tender; constant    Temperature: denies fever    What has been tried: Ibuprofen    LMP: 4/12/22 Pregnant: No    Recommended disposition: Go to ED/[facility] Now (Or to Office with PCP Approval). [address] office, which pt states that's where her family goes, but pt is considered un-established at this point. Called office to confirm. Advised pt to go to ED, but we can try to make an appt to get re-established. Pt upset and hung up phone. Care advice provided, patient verbalizes understanding; denies any other questions or concerns; instructed to call back for any new or worsening symptoms. Benita Hooker up phone     Attention Provider: Thank you for allowing me to participate in the care of your patient. The patient was connected to triage in response to information provided to the ECC/PSC.   Please do not respond through this encounter as the response is not dire    Reason for Disposition   Thigh or calf pain in only one leg and present > 1 hour    Protocols used: LEG PAIN-ADULT-[place]

## 2022-09-30 ENCOUNTER — HOSPITAL ENCOUNTER (OUTPATIENT)
Age: 42
Discharge: HOME | End: 2022-09-30
Payer: COMMERCIAL

## 2022-09-30 DIAGNOSIS — E55.9 VITAMIN D INSUFFICIENCY: ICD-10-CM

## 2022-09-30 DIAGNOSIS — R73.9 ELEVATED BLOOD SUGAR: ICD-10-CM

## 2022-09-30 DIAGNOSIS — Z13.9 SCREENING FOR CONDITION: ICD-10-CM

## 2022-09-30 DIAGNOSIS — R53.83 FATIGUE, UNSPECIFIED TYPE: ICD-10-CM

## 2022-09-30 LAB
ALBUMIN SERPL-MCNC: 4.4 G/DL (ref 3.5–5.2)
ALP BLD-CCNC: 67 U/L (ref 35–104)
ALT SERPL-CCNC: 16 U/L (ref 5–33)
ANION GAP SERPL CALCULATED.3IONS-SCNC: 10 MMOL/L (ref 9–17)
AST SERPL-CCNC: 15 U/L
BILIRUB SERPL-MCNC: 0.4 MG/DL (ref 0.3–1.2)
BUN BLDV-MCNC: 13 MG/DL (ref 6–20)
CALCIUM SERPL-MCNC: 9.1 MG/DL (ref 8.6–10.4)
CHLORIDE BLD-SCNC: 105 MMOL/L (ref 98–107)
CHOLESTEROL, FASTING: 178 MG/DL
CHOLESTEROL/HDL RATIO: 3.4
CO2: 24 MMOL/L (ref 20–31)
CREAT SERPL-MCNC: 0.57 MG/DL (ref 0.5–0.9)
FOLATE: 10.7 NG/ML
GFR AFRICAN AMERICAN: >60 ML/MIN
GFR NON-AFRICAN AMERICAN: >60 ML/MIN
GFR SERPL CREATININE-BSD FRML MDRD: NORMAL ML/MIN/{1.73_M2}
GLUCOSE BLD-MCNC: 87 MG/DL (ref 70–99)
HCT VFR BLD CALC: 40.5 % (ref 36–46)
HDLC SERPL-MCNC: 52 MG/DL
HEMOGLOBIN: 13.1 G/DL (ref 12–16)
LDL CHOLESTEROL: 111 MG/DL (ref 0–130)
MCH RBC QN AUTO: 28.5 PG (ref 26–34)
MCHC RBC AUTO-ENTMCNC: 32.3 G/DL (ref 31–37)
MCV RBC AUTO: 88.2 FL (ref 80–100)
PDW BLD-RTO: 12.6 % (ref 11.5–14.9)
PLATELET # BLD: 306 K/UL (ref 150–450)
PMV BLD AUTO: 8.2 FL (ref 6–12)
POTASSIUM SERPL-SCNC: 4.1 MMOL/L (ref 3.7–5.3)
RBC # BLD: 4.6 M/UL (ref 4–5.2)
SODIUM BLD-SCNC: 139 MMOL/L (ref 135–144)
THYROXINE, FREE: 1.06 NG/DL (ref 0.93–1.7)
TOTAL PROTEIN: 7.2 G/DL (ref 6.4–8.3)
TRIGLYCERIDE, FASTING: 77 MG/DL
TSH SERPL DL<=0.05 MIU/L-ACNC: 0.88 UIU/ML (ref 0.3–5)
VITAMIN B-12: 635 PG/ML (ref 232–1245)
VITAMIN D 25-HYDROXY: 40.5 NG/ML
WBC # BLD: 8.7 K/UL (ref 3.5–11)

## 2022-09-30 PROCEDURE — 82306 VITAMIN D 25 HYDROXY: CPT

## 2022-09-30 PROCEDURE — 80061 LIPID PANEL: CPT

## 2022-09-30 PROCEDURE — 80053 COMPREHEN METABOLIC PANEL: CPT

## 2022-09-30 PROCEDURE — 84443 ASSAY THYROID STIM HORMONE: CPT

## 2022-09-30 PROCEDURE — 36415 COLL VENOUS BLD VENIPUNCTURE: CPT

## 2022-09-30 PROCEDURE — 83036 HEMOGLOBIN GLYCOSYLATED A1C: CPT

## 2022-09-30 PROCEDURE — 84439 ASSAY OF FREE THYROXINE: CPT

## 2022-09-30 PROCEDURE — 85027 COMPLETE CBC AUTOMATED: CPT

## 2022-09-30 PROCEDURE — 82746 ASSAY OF FOLIC ACID SERUM: CPT

## 2022-09-30 PROCEDURE — 82607 VITAMIN B-12: CPT

## 2022-10-05 LAB
ESTIMATED AVERAGE GLUCOSE: 114 MG/DL
HBA1C MFR BLD: 5.6 % (ref 4–6)

## 2022-10-21 ENCOUNTER — INITIAL CONSULT (OUTPATIENT)
Dept: BARIATRICS/WEIGHT MGMT | Age: 42
End: 2022-10-21
Payer: COMMERCIAL

## 2022-10-21 ENCOUNTER — HOSPITAL ENCOUNTER (OUTPATIENT)
Dept: WOMENS IMAGING | Age: 42
Discharge: HOME | End: 2022-10-23
Payer: COMMERCIAL

## 2022-10-21 VITALS
WEIGHT: 189 LBS | HEIGHT: 62 IN | RESPIRATION RATE: 20 BRPM | HEART RATE: 80 BPM | DIASTOLIC BLOOD PRESSURE: 76 MMHG | BODY MASS INDEX: 34.78 KG/M2 | SYSTOLIC BLOOD PRESSURE: 128 MMHG

## 2022-10-21 DIAGNOSIS — R13.19 ESOPHAGEAL DYSPHAGIA: ICD-10-CM

## 2022-10-21 DIAGNOSIS — R11.2 INTRACTABLE NAUSEA AND VOMITING: ICD-10-CM

## 2022-10-21 DIAGNOSIS — Z12.31 ENCOUNTER FOR SCREENING MAMMOGRAM FOR MALIGNANT NEOPLASM OF BREAST: ICD-10-CM

## 2022-10-21 DIAGNOSIS — K22.70 BARRETT'S ESOPHAGUS WITHOUT DYSPLASIA: Primary | ICD-10-CM

## 2022-10-21 PROCEDURE — 99204 OFFICE O/P NEW MOD 45 MIN: CPT | Performed by: SURGERY

## 2022-10-21 PROCEDURE — 77067 SCR MAMMO BI INCL CAD: CPT

## 2022-10-21 RX ORDER — OMEPRAZOLE 20 MG/1
20 CAPSULE, DELAYED RELEASE ORAL
Qty: 180 CAPSULE | Refills: 1 | Status: SHIPPED | OUTPATIENT
Start: 2022-10-21

## 2022-10-21 NOTE — PROGRESS NOTES
[address] [facility] MIN INVASIVE BARIATRIC SURG  [address] [place] [address] [place]  [address]  [address] [facility] [address]  Dept: [phone]    ROBOTIC AND MINIMALLY INVASIVE SURGERY  PROGRESS NOTE INITIAL EVALUATION     Patient: Agnes Preston        Service Date: 10/21/2022      HPI:     Chief Complaint   Patient presents with    Gastroesophageal Reflux    Consultation    Emesis       The patient is a pleasant 38y.o. year old female  who presents today for GERD. She reports vomiting and dysphagia after 90% of her meals for the past 5 years. She notes that she has followed with Dr. Mary Yip for a colonoscopy and EGD, and he placed her on PPIs. She notes that she is no longer taking PPIs, and she is taking Tums as this helps quicker. She denies dysphagia. She also admits to belching undigested foods. The patient denies  a history of myocardial infarction, deep vein thrombosis, pulmonary embolism, renal failure, hepatic failure, stroke, and seizure. She had an EGD and colonoscopy on 2/5/21, which showed \"Terminal ileum: normal. Cecum/Ascending colon: normal. Transverse colon: abnormal: MOD DIVERTICULOSIS. Descending/Sigmoid colon: abnormal: MILD TO MOD DIVERTICULOSIS DIFFUSE. Rectum/Anus: examined in normal and retroflexed positions and was abnormal: SMALL INT HEMORRHODIS. Retropharyngeal area was grossly normal appearing. Esophagus: abnormal: MILD ESOPHAGITIS AT GE JUNCTION BIOPSIES AND PICTURES WERE TAKEN. Stomach: normal. Fundus: normal. Body: normal. Antrum: abnormal: MILD EROSIVE GASTRITIS WAS BIOPSIED. Duodenum: Descending: normal. RANDOM BIOPSIES WERE TAKEN FOR C SPRUE. Bulb: normal\"       Medical History:  Past Medical History:   Diagnosis Date    Abnormal Pap smear of cervix     follows with Linda    Anxiety     Asthma     physically induced    Flores's esophagus 02/05/2021    Depression     teen    Endometriosis     hx.     Erosive gastritis 02/05/2021    Esophageal dysphagia     Family history of colon cancer 01/21/2021    Maternal grandfather    GERD (gastroesophageal reflux disease)     Hematemesis/vomiting blood     x1    Nausea and vomiting     Rectal bleeding     Unintended weight loss        Surgical History:  Past Surgical History:   Procedure Laterality Date    ABDOMINAL EXPLORATION SURGERY      for endometriosis.     COLONOSCOPY N/A 02/05/2021    COLONOSCOPY DIAGNOSTIC performed by Marleny Tavera MD at [address] Right     scope bursitis     TUBAL LIGATION      UPPER GASTROINTESTINAL ENDOSCOPY N/A 02/05/2021    SALGADO'S    WISDOM TOOTH EXTRACTION         Family History:      Problem Relation Age of Onset    Arthritis Mother     Diabetes Mother     High Cholesterol Mother     Arthritis Father     Diabetes Father     High Cholesterol Father     Arthritis Sister     Arthritis Brother     Arthritis Maternal Aunt     Arthritis Maternal Uncle     Arthritis Paternal Aunt     Arthritis Paternal Uncle     Arthritis Maternal Grandmother     Asthma Maternal Grandmother     Diabetes Maternal Grandmother     Heart Disease Maternal Grandmother         CHF    High Blood Pressure Maternal Grandmother     Anemia Maternal Grandfather     Arthritis Maternal Grandfather     Colon Cancer Maternal Grandfather     Prostate Cancer Maternal Grandfather     Cancer Maternal Grandfather         skin    High Blood Pressure Maternal Grandfather     Arthritis Paternal Grandmother     Cancer Paternal Grandmother         stomach    Stroke Paternal Grandmother     Asthma Maternal Cousin     Other Other         Possible hx of blood clots       Social History:   Social History     Tobacco Use    Smoking status: Never    Smokeless tobacco: Never    Tobacco comments:     Few cigarettes a long time ago   Vaping Use    Vaping Use: Never used   Substance Use Topics    Alcohol use: Yes     Comment: rare     Drug use: Never       Current Med List:  Current Outpatient Medications   Medication Sig Dispense Refill    omeprazole (PRILOSEC) 20 MG delayed release capsule Take 1 capsule by mouth 2 times daily (before meals) 180 capsule 1    PARoxetine (PAXIL) 10 MG tablet Take 1 tablet by mouth daily 30 tablet 3    cetirizine (ZYRTEC) 10 MG tablet TAKE ONE TABLET BY MOUTH DAILY 30 tablet 5    ibuprofen (ADVIL;MOTRIN) 600 MG tablet TAKE ONE TABLET BY MOUTH EVERY 8 HOURS AS NEEDED FOR PAIN OR FEVER 30 tablet 0    triamcinolone (NASACORT) 55 MCG/ACT nasal inhaler 2 sprays by Nasal route daily 1 Inhaler 0    albuterol sulfate HFA (PROAIR HFA) 108 (90 Base) MCG/ACT inhaler Inhale 2 puffs into the lungs every 6 hours as needed for Wheezing 1 Inhaler 3     No current facility-administered medications for this visit. SOCIAL:      This patient is alone for the evaluation today.       [] HIV Risk Factors (i.e.) intravenous drug abuser; at risk sexual behavior; received blood products    [] TB Risk Factors (i.e.) Medically underserved, institutional care, foreign born, endemic area; exposure to active case    [] Hepatitis B&C Risk Factors (i.e.) Received blood transfusion prior to 1992; recreational drug use; high risk sexual behaviors; tattoos or body piercings; contact with blood or needle sticks in the workplace    Comprehension    Ability to grasp concepts and respond to questions:   [x] High   [] Medium   [] Low    Motivation    [x] Asks Questions; eager to learn   [] Needs education   [] Extreme anxiety    [] uncooperative   [] Denies need for education    English Speaking Ability    [x] Speaks English well   [x] Reads English well   [x] Understands spoken english    [x] Understands written English   [] No need for interpretive support      [] Might benefit from interpretive support   []  required for all services     REVIEW OF SYSTEMS: (Negative unless marked otherwise)     See review of Systems scanned into media    PRESENT ILLNESS:     Weight Parameters  Weight 189 lb (85.7 kg)   Height 5' 2\" (1.575 m)   BMI Body mass index is 34.57 kg/m². IBW     EBW               IMMUNIZATION STATUS  Immunization History   Administered Date(s) Administered    COVID-19, MODERNA BLUE border, Primary or Immunocompromised, (age 12y+), IM, 100 mcg/0.5mL 12/30/2020, 01/27/2021    Tdap (Boostrix, Adacel) 02/09/2018       FALLS ASSESSMENT    [x] LOW RISK FOR FALLS    [] MODERATE RISK FOR FALLS    [] Difficulty walking/selfcare    [] Falls in the past 2 months    [] Suspicion of Clinician    [] Other:      SMOKING CESSATION     [x] Not needed     [] Instructed to stop smoking    [] Pamphlet community resources given     VTE SCREEN    [] Family hx DVT/PE  /   [] Personal hx of DVT/PE    [x] Denies any family or personal hx of DVT/PE    Physician Review    [x] Past medical, family, & social history reviewed and discussed with patient. Review of surgery and post-surgical changes (by surgeon for surgical patients only)    [x] Lifelong diet expectations reviewed with patient    [x] Need for lifelong vitamin supplementation reviewed with patient    PHYSICAL EXAMINATION:      /76 (Site: Right Upper Arm, Position: Sitting, Cuff Size: Large Adult)   Pulse 80   Resp 20   Ht 5' 2\" (1.575 m)   Wt 189 lb (85.7 kg)   LMP 10/06/2022   BMI 34.57 kg/m²     Constitutional:  Vital signs are normal. The patient appears well-developed   HEENT:      Head: Normocephalic. Atraumatic     Eyes: pupils are equal and reactive. No scleral icterus is present. Neck: No mass and no thyromegaly present. Cardiovascular: Normal rate, regular rhythm, S1 normal and S2 normal.  Bilateral pulses present. Pulmonary/Chest: Effort normal and breath sounds normal. No retractions. Abdominal: Soft. Normal appearance. There is no organomegaly. No tenderness. There is no rigidity, no rebound, no guarding and no De La Torre's sign. Musculoskeletal:      Right lower leg: Normal. No tenderness and no edema.       Left lower leg: Normal. No tenderness and no edema. Lymphadenopathy:     No cervical adenopathy, No Exrtemity Adenopathy. Neurological: The patient is alert and oriented. Moving all four extremities equally, sensation grossly intact bilateral.  Skin: Skin is warm, dry and intact. Psychiatric: The patient has a normal mood and affect. Speech is normal and behavior is normal. Judgment and thought content normal. Cognition and memory are normal.     RECOMMENDATIONS:       PLAN:       Diagnosis Orders   1. Salgado's esophagus without dysplasia        2. Esophageal dysphagia  FL Upper Gi W Air Contrast    NM GASTRIC EMPTYING      3. Intractable nausea and vomiting  US GALLBLADDER RUQ             DYSPHAGIA AND VOMITING  EGD reviewed with patient  Ordered gastric emptying study  Ordered US gallbladder RUQ    SALGADO'S ESOPHAGUS  Discussed the importance of taking her PPIs, as well as risks of esophageal cancer. Stressed that she does need to take PPI  Start taking Prilosec 20 mg, as prescribed    Follow up 3-4 weeks after testing    Symptoms seem to vary, will check UGI and gastric emptying study given persistent nausea and vomiting. Weight however appears stable . All questions answered    Scribe Attestation:  Brenda Young, scribed on behalf of Spencer Guthrie DO. Electronically signed by Spencer Guthrie DO on 10/21/2022 at 10:39 AM    I, Spencer Guthrie DO DO, personally performed the services described in this documentation. All medical record entries made by the scribe were at my direction and in my presence. I have reviewed the chart and discharge instructions (if applicable) and agree that the record reflects my personal performance and is accurate and complete.     Electronically Signed: Spencer Guthrie DO. 10/27/22. 8:09 PM.

## 2022-10-25 ENCOUNTER — HOSPITAL ENCOUNTER (OUTPATIENT)
Dept: ULTRASOUND IMAGING | Age: 42
Discharge: HOME | End: 2022-10-27
Payer: COMMERCIAL

## 2022-10-25 DIAGNOSIS — R11.2 INTRACTABLE NAUSEA AND VOMITING: ICD-10-CM

## 2022-10-25 PROCEDURE — 76705 ECHO EXAM OF ABDOMEN: CPT

## 2022-10-31 ENCOUNTER — HOSPITAL ENCOUNTER (OUTPATIENT)
Dept: NUCLEAR MEDICINE | Age: 42
Discharge: HOME | End: 2022-11-02
Payer: COMMERCIAL

## 2022-10-31 DIAGNOSIS — R13.19 ESOPHAGEAL DYSPHAGIA: ICD-10-CM

## 2022-10-31 PROCEDURE — 78264 GASTRIC EMPTYING IMG STUDY: CPT

## 2022-10-31 PROCEDURE — A9541 TC99M SULFUR COLLOID: HCPCS | Performed by: SURGERY

## 2022-10-31 PROCEDURE — 3430000000 HC RX DIAGNOSTIC RADIOPHARMACEUTICAL: Performed by: SURGERY

## 2022-10-31 RX ADMIN — Medication 1 MILLICURIE: at 08:15

## 2022-11-15 ENCOUNTER — HOSPITAL ENCOUNTER (OUTPATIENT)
Dept: GENERAL RADIOLOGY | Age: 42
Discharge: HOME | End: 2022-11-17
Payer: COMMERCIAL

## 2022-11-15 DIAGNOSIS — R13.19 ESOPHAGEAL DYSPHAGIA: ICD-10-CM

## 2022-11-15 PROCEDURE — 2500000003 HC RX 250 WO HCPCS: Performed by: SURGERY

## 2022-11-15 PROCEDURE — 74246 X-RAY XM UPR GI TRC 2CNTRST: CPT

## 2022-11-15 RX ADMIN — BARIUM SULFATE 140 ML: 980 POWDER, FOR SUSPENSION ORAL at 09:30

## 2022-11-15 RX ADMIN — BARIUM SULFATE 160 ML: 960 POWDER, FOR SUSPENSION ORAL at 09:30

## 2022-11-17 ENCOUNTER — TELEPHONE (OUTPATIENT)
Dept: BARIATRICS/WEIGHT MGMT | Age: 42
End: 2022-11-17

## 2022-11-17 ENCOUNTER — OFFICE VISIT (OUTPATIENT)
Dept: BARIATRICS/WEIGHT MGMT | Age: 42
End: 2022-11-17
Payer: COMMERCIAL

## 2022-11-17 VITALS
HEIGHT: 62 IN | DIASTOLIC BLOOD PRESSURE: 88 MMHG | SYSTOLIC BLOOD PRESSURE: 128 MMHG | WEIGHT: 190 LBS | HEART RATE: 92 BPM | BODY MASS INDEX: 34.96 KG/M2

## 2022-11-17 DIAGNOSIS — K21.9 HIATAL HERNIA WITH GERD: Primary | ICD-10-CM

## 2022-11-17 DIAGNOSIS — R11.2 INTRACTABLE NAUSEA AND VOMITING: ICD-10-CM

## 2022-11-17 DIAGNOSIS — K44.9 HIATAL HERNIA WITH GERD: Primary | ICD-10-CM

## 2022-11-17 DIAGNOSIS — E66.9 OBESITY (BMI 30-39.9): ICD-10-CM

## 2022-11-17 PROCEDURE — 99213 OFFICE O/P EST LOW 20 MIN: CPT | Performed by: SURGERY

## 2022-11-17 NOTE — PROGRESS NOTES
[address] [facility] MIN INVASIVE BARIATRIC SURG  [address] [place] [address] [address] [address]  Dept: [phone]    ROBOTIC AND MINIMALLY INVASIVE SURGERY  PROGRESS NOTE EVALUATION     Patient: Katiana Zavala        Service Date: 11/17/2022      HPI:     Chief Complaint   Patient presents with    Gastroesophageal Reflux     3 week follow up, gastric emptying and ugi results         The patient is a pleasant 38y.o. year old female  who presents today for GERD follow up. She reports vomiting after most of her meals for the past 5 years. She states that she has started taking Prilosec 20 mg, with minimal improvement. She denies dysphagia and abdominal pain. She had an US of the gallbladder RUQ on 10/25/22, which was normal. Her gastric emptying study from 10/31/22 was also normal.    She had an EGD and colonoscopy on 2/5/21, which showed \"Terminal ileum: normal. Cecum/Ascending colon: normal. Transverse colon: abnormal: MOD DIVERTICULOSIS. Descending/Sigmoid colon: abnormal: MILD TO MOD DIVERTICULOSIS DIFFUSE. Rectum/Anus: examined in normal and retroflexed positions and was abnormal: SMALL INT HEMORRHODIS. Retropharyngeal area was grossly normal appearing. Esophagus: abnormal: MILD ESOPHAGITIS AT GE JUNCTION BIOPSIES AND PICTURES WERE TAKEN. Stomach: normal. Fundus: normal. Body: normal. Antrum: abnormal: MILD EROSIVE GASTRITIS WAS BIOPSIED. Duodenum: Descending: normal. RANDOM BIOPSIES WERE TAKEN FOR C SPRUE. Bulb: normal\"       Medical History:  Past Medical History:   Diagnosis Date    Abnormal Pap smear of cervix     follows with Linda    Anxiety     Asthma     physically induced    Flores's esophagus 02/05/2021    Depression     teen    Endometriosis     hx.     Erosive gastritis 02/05/2021    Esophageal dysphagia     Family history of colon cancer 01/21/2021    Maternal grandfather    GERD (gastroesophageal reflux disease)     Hematemesis/vomiting blood x1    Nausea and vomiting     Rectal bleeding     Unintended weight loss        Surgical History:  Past Surgical History:   Procedure Laterality Date    ABDOMINAL EXPLORATION SURGERY      for endometriosis.     COLONOSCOPY N/A 02/05/2021    COLONOSCOPY DIAGNOSTIC performed by Cari Vasquez MD at [address] Right     scope bursitis     TUBAL LIGATION      UPPER GASTROINTESTINAL ENDOSCOPY N/A 02/05/2021    SALGADO'S    WISDOM TOOTH EXTRACTION         Family History:      Problem Relation Age of Onset    Arthritis Mother     Diabetes Mother     High Cholesterol Mother     Arthritis Father     Diabetes Father     High Cholesterol Father     Arthritis Sister     Arthritis Brother     Arthritis Maternal Aunt     Arthritis Maternal Uncle     Arthritis Paternal Aunt     Arthritis Paternal Uncle     Arthritis Maternal Grandmother     Asthma Maternal Grandmother     Diabetes Maternal Grandmother     Heart Disease Maternal Grandmother         CHF    High Blood Pressure Maternal Grandmother     Anemia Maternal Grandfather     Arthritis Maternal Grandfather     Colon Cancer Maternal Grandfather     Prostate Cancer Maternal Grandfather     Cancer Maternal Grandfather         skin    High Blood Pressure Maternal Grandfather     Arthritis Paternal Grandmother     Cancer Paternal Grandmother         stomach    Stroke Paternal Grandmother     Asthma Maternal Cousin     Other Other         Possible hx of blood clots       Social History:   Social History     Tobacco Use    Smoking status: Never    Smokeless tobacco: Never    Tobacco comments:     Few cigarettes a long time ago   Vaping Use    Vaping Use: Never used   Substance Use Topics    Alcohol use: Yes     Comment: rare     Drug use: Never       Current Med List:  Current Outpatient Medications   Medication Sig Dispense Refill    omeprazole (PRILOSEC) 20 MG delayed release capsule Take 1 capsule by mouth 2 times daily (before meals) 180 capsule 1    PARoxetine (PAXIL) 10 MG tablet Take 1 tablet by mouth daily 30 tablet 3    cetirizine (ZYRTEC) 10 MG tablet TAKE ONE TABLET BY MOUTH DAILY 30 tablet 5    ibuprofen (ADVIL;MOTRIN) 600 MG tablet TAKE ONE TABLET BY MOUTH EVERY 8 HOURS AS NEEDED FOR PAIN OR FEVER 30 tablet 0    triamcinolone (NASACORT) 55 MCG/ACT nasal inhaler 2 sprays by Nasal route daily 1 Inhaler 0    albuterol sulfate HFA (PROAIR HFA) 108 (90 Base) MCG/ACT inhaler Inhale 2 puffs into the lungs every 6 hours as needed for Wheezing 1 Inhaler 3     No current facility-administered medications for this visit. SOCIAL:      This patient is alone for the evaluation today.       [] HIV Risk Factors (i.e.) intravenous drug abuser; at risk sexual behavior; received blood products    [] TB Risk Factors (i.e.) Medically underserved, institutional care, foreign born, endemic area; exposure to active case    [] Hepatitis B&C Risk Factors (i.e.) Received blood transfusion prior to 1992; recreational drug use; high risk sexual behaviors; tattoos or body piercings; contact with blood or needle sticks in the workplace    Comprehension    Ability to grasp concepts and respond to questions:   [x] High   [] Medium   [] Low    Motivation    [x] Asks Questions; eager to learn   [] Needs education   [] Extreme anxiety    [] uncooperative   [] Denies need for education    English Speaking Ability    [x] Speaks English well   [x] Reads [place] well   [x] Understands spoken english    [x] Understands written English   [] No need for interpretive support      [] Might benefit from interpretive support   []  required for all services     REVIEW OF SYSTEMS: (Negative unless marked otherwise)     General  Negative for: [] Weight Change   [x] Fatigue   [x] Fevers & Chills    [] Appetite change [] Other:    Positive for: [x] Weight Change   [] Fatigue   [] Fevers & Chills    [] Appetite change [] Other:   Cardiac  Negative for: [x] Chest Pain   [x] Difficulty Breathing [] Leg Cramps [x] Edema of Lower Extremeties    [] Left   [] Right      Positive for: [] Chest Pain   [] Difficulty Breathing   [] Leg Cramps [] Edema of Lower Extremeties    [] Left   [] Right   Pulmonary  Negative for: [x] Shortness of Breath [] Wheeze [x] Cough  [] Calf Pain     Positive for: [] Shortness of Breath [] Wheeze [] Cough  [] Calf Pain   Gastro-Intestinal Negative for: [] Heartburn   [] Reflux   [] Dysphagia   [] Melena   [] BRBPR  [] Vomiting   [] Abdominal Pain   [] Diarrhea     [] Constipation  [] Other:     Positive for: [] Heartburn   [] Reflux   [] Dysphagia   [] Melena   [] BRBPR  [x] Vomiting   [x] Abdominal Pain   [] Diarrhea     [] Constipation  [] Other:    Muskuloskeletal Negative for: [] Joint pain   [] Back pain   [] Knee Pain   [x] Muscle weakness [x] Hernia   [] Edema [] Other:     Positive for: [] Joint pain   [] Back pain   [] Knee Pain   [] Muscle weakness [] Hernia   [] Edema [] Other:    Neurologic Negative for: [x] Syncope   [x] Insomnia   [] Being treated for depression  [] Other:     Positive for: [] Syncope   [] Insomnia   [] Being treated for depression  [] Other:    Skin Negative for: [] Wound:   [] Open   [] Draining   [] Incisional     [x] Rash   [x] Hair Loss  [] Other:     Positive for: [] Wound:   [] Open   [] Draining    [] Incisional  [] Rash   [] Hair Loss  [] Other:            PRESENT ILLNESS:     Weight Parameters  Weight 190 lb (86.2 kg)   Height 5' 2\" (1.575 m)   BMI Body mass index is 34.75 kg/m².    IBW     EBW               IMMUNIZATION STATUS  Immunization History   Administered Date(s) Administered    COVID-19, MODERNA BLUE border, Primary or Immunocompromised, (age 12y+), IM, 100 mcg/0.5mL 12/30/2020, 01/27/2021    Tdap (Boostrix, Adacel) 02/09/2018       FALLS ASSESSMENT    [x] LOW RISK FOR FALLS    [] MODERATE RISK FOR FALLS    [] Difficulty walking/selfcare    [] Falls in the past 2 months    [] Suspicion of Clinician    [] Other:      SMOKING CESSATION Intractable nausea and vomiting  CT ABDOMEN PELVIS W IV CONTRAST Additional Contrast? Oral               DYSPHAGIA AND VOMITING  EGD reviewed with patient  Reviewed gastric emptying study  Reviewed US gallbladder RUQ  CT of abdomen and Pelvis ordered    SALGADO'S ESOPHAGUS  Continue taking Prilosec 20 mg, as prescribed      Scribe Attestation:  IBeau, scribed on behalf of Kimberly Earl DO. Electronically signed by Kimberly Earl DO on 11/17/2022 at 8:44 AM    Please note that this chart was generated using voice recognition Dragon dictation software. Although every effort was made to ensure the accuracy of this automated transcription, some errors in transcription may have occurred. Everardo Jeffrey DO DO, personally performed the services described in this documentation. All medical record entries made by the scribe were at my direction and in my presence. I have reviewed the chart and discharge instructions (if applicable) and agree that the record reflects my personal performance and is accurate and complete.     Electronically Signed: Kimberly Earl DO. 11/25/22. 12:29 PM.

## 2022-11-17 NOTE — TELEPHONE ENCOUNTER
BRONSON roach/Bravo on 12/6/22 @ 1:30 pm to arrive at 12:15 to surgery center. Stop all PPI's as of 12/29/22. Patient verbalized understanding.

## 2022-11-28 ENCOUNTER — HOSPITAL ENCOUNTER (OUTPATIENT)
Dept: CT IMAGING | Age: 42
Discharge: HOME | End: 2022-11-30
Payer: COMMERCIAL

## 2022-11-28 DIAGNOSIS — R11.2 INTRACTABLE NAUSEA AND VOMITING: ICD-10-CM

## 2022-11-28 PROCEDURE — 74177 CT ABD & PELVIS W/CONTRAST: CPT

## 2022-11-28 PROCEDURE — A4641 RADIOPHARM DX AGENT NOC: HCPCS | Performed by: SURGERY

## 2022-11-28 PROCEDURE — 2580000003 HC RX 258: Performed by: SURGERY

## 2022-11-28 PROCEDURE — 6360000004 HC RX CONTRAST MEDICATION: Performed by: SURGERY

## 2022-11-28 RX ORDER — SODIUM CHLORIDE 0.9 % (FLUSH) 0.9 %
10 SYRINGE (ML) INJECTION 2 TIMES DAILY
Status: DISCONTINUED | OUTPATIENT
Start: 2022-11-28 | End: 2022-12-01 | Stop reason: HOSPADM

## 2022-11-28 RX ORDER — 0.9 % SODIUM CHLORIDE 0.9 %
80 INTRAVENOUS SOLUTION INTRAVENOUS ONCE
Status: COMPLETED | OUTPATIENT
Start: 2022-11-28 | End: 2022-11-28

## 2022-11-28 RX ADMIN — IOPAMIDOL 75 ML: 755 INJECTION, SOLUTION INTRAVENOUS at 09:05

## 2022-11-28 RX ADMIN — SODIUM CHLORIDE 80 ML: 9 INJECTION, SOLUTION INTRAVENOUS at 09:05

## 2022-11-28 RX ADMIN — SODIUM CHLORIDE, PRESERVATIVE FREE 10 ML: 5 INJECTION INTRAVENOUS at 09:05

## 2022-11-28 RX ADMIN — BARIUM SULFATE 450 ML: 20 SUSPENSION ORAL at 09:05

## 2022-11-30 ENCOUNTER — TELEPHONE (OUTPATIENT)
Dept: BARIATRICS/WEIGHT MGMT | Age: 42
End: 2022-11-30

## 2022-11-30 NOTE — TELEPHONE ENCOUNTER
Patient's EGD w/Bravo was denied d/t:    No pathology was performed showing need for study. I explained to the  on the phone that we had not completed the procedure to get the pathology report. They are also asking if she has had a change in symptoms since her 2/5/21 previous EGD. I have scheduled a Peer to Peer on 12/2/22 at 2pm to try and get denial over turned.

## 2022-12-02 NOTE — TELEPHONE ENCOUNTER
Dr Claudette Melgoza spoke with Aneesh Morin- Peer to Peer. Auth# is [id number] exp 5/31/23 for EGD w/Bravo.

## 2022-12-13 ENCOUNTER — TELEPHONE (OUTPATIENT)
Dept: BARIATRICS/WEIGHT MGMT | Age: 42
End: 2022-12-13

## 2022-12-13 NOTE — TELEPHONE ENCOUNTER
Spoke with patient regarding change in EGD/bravo time. Changed from 1:00 pm to 12:00 pm to arrive at 10:30 am    Patient verbalized understanding.

## 2022-12-15 ENCOUNTER — TELEPHONE (OUTPATIENT)
Dept: BARIATRICS/WEIGHT MGMT | Age: 42
End: 2022-12-15

## 2022-12-15 NOTE — TELEPHONE ENCOUNTER
Called patient and let her know we are cancelling EGD w/bravo for 12/19/22 and we will call her back to r/s once we know when. Patient verbalized understanding.

## 2023-01-03 NOTE — TELEPHONE ENCOUNTER
48 hour EGD Bravo scheduled for 1/10/23 @ 10:20 am.  Arrive to surgery center at 9:00 am NPO after midnight. Stop taking all PPI's and ASA products. Patient verbalized understanding.

## 2023-01-09 ENCOUNTER — ANESTHESIA EVENT (OUTPATIENT)
Dept: OPERATING ROOM | Age: 43
End: 2023-01-09
Payer: COMMERCIAL

## 2023-01-09 RX ORDER — ACETAMINOPHEN 500 MG
500 TABLET ORAL EVERY 6 HOURS PRN
COMMUNITY

## 2023-01-09 NOTE — H&P
History and Physical    Pt Name: Cesar Carlos  MRN: [id number]  YOB: 1980  Date of evaluation: 1/10/2023  Primary Care Physician: BRENDON Armstrong CNP    SUBJECTIVE:   History of Chief Complaint:    Cesar Carlos is a 38 y.o. female who is scheduled today for 50 HOUR BRAVO EGD. She reports complaint of vomiting after most meals without weight loss for 3 years. She also reports slight dysphagia. Hx of GERD and Flores's. Allergies  has No Known Allergies. Medications  Prior to Admission medications    Medication Sig Start Date End Date Taking? Authorizing Provider   acetaminophen (TYLENOL) 500 MG tablet Take 500 mg by mouth every 6 hours as needed for Pain   Yes Historical Provider, MD   omeprazole (PRILOSEC) 20 MG delayed release capsule Take 1 capsule by mouth 2 times daily (before meals) 10/21/22   Alex Vidal DO   PARoxetine (PAXIL) 10 MG tablet Take 1 tablet by mouth daily 9/28/22   BRENDON Armstrong CNP   cetirizine (ZYRTEC) 10 MG tablet TAKE ONE TABLET BY MOUTH DAILY 10/21/20   BRENDON Castro CNP   triamcinolone (NASACORT) 55 MCG/ACT nasal inhaler 2 sprays by Nasal route daily 4/19/19   BRENDON Castro CNP   albuterol sulfate HFA (PROAIR HFA) 108 (90 Base) MCG/ACT inhaler Inhale 2 puffs into the lungs every 6 hours as needed for Wheezing 2/9/18   BRENDON Castro CNP     Past Medical History    has a past medical history of Abnormal Pap smear of cervix, Anxiety, Asthma, Flores's esophagus, Depression, Endometriosis, Erosive gastritis, Esophageal dysphagia, Family history of colon cancer, GERD (gastroesophageal reflux disease), Headache, Hematemesis/vomiting blood, History of COVID-19, History of kidney stones, Nausea and vomiting, Rectal bleeding, and Unintended weight loss. Past Surgical History   has a past surgical history that includes knee surgery (Right); Abdominal exploration surgery (1995); [place] tooth extraction; Tubal ligation;  Upper gastrointestinal endoscopy (N/A, 2021); and Colonoscopy (N/A, 2021). Social History   reports that she has never smoked. She has never used smokeless tobacco.   reports that she does not currently use alcohol. reports no history of drug use. Marital Status    Children 2 biological, 2 step  Occupation early childhood education  Family History  Family Status   Relation Name Status    Mother  Alive    Father  Alive    Sister  (Not Specified)    Brother  Alive    [address]may Ad Michael 53    MGM  Alive    MGF      PGM  (Not Specified)    MCousin  (Not Specified)    Other  (Not Specified)     family history includes Anemia in her maternal grandfather; Arthritis in her brother, father, maternal aunt, maternal grandfather, maternal grandmother, maternal uncle, mother, paternal aunt, paternal grandmother, paternal uncle, and sister; Asthma in her maternal cousin and maternal grandmother; Cancer in her maternal grandfather and paternal grandmother; Hodan Axis in her maternal grandfather; Diabetes in her father, maternal grandmother, and mother; Heart Disease in her maternal grandmother; High Blood Pressure in her maternal grandfather and maternal grandmother; High Cholesterol in her father and mother; Other in an other family member; Prostate Cancer in her maternal grandfather; Stroke in her paternal grandmother.     Review of Systems:  CONSTITUTIONAL:   negative for fevers, chills, fatigue and malaise    EYES:   negative for double vision, blurred vision and photophobia    HEENT:   negative for tinnitus, epistaxis and sore throat     RESPIRATORY:   negative for cough, shortness of breath, wheezing     CARDIOVASCULAR:   negative for chest pain, palpitations, syncope, edema     GASTROINTESTINAL:   +see HPI   GENITOURINARY:   negative for incontinence     MUSCULOSKELETAL:   negative for neck or back pain     NEUROLOGICAL:   Negative for weakness and tingling  negative for headaches and dizziness     PSYCHIATRIC:   negative for anxiety       OBJECTIVE:   VITALS:  height is 5' 2\" (1.575 m) and weight is 185 lb (83.9 kg). Her temporal temperature is 97.9 °F (36.6 °C). Her blood pressure is 134/89 and her pulse is 91. Her respiration is 16 and oxygen saturation is 97%. CONSTITUTIONAL:alert & oriented x 3, no acute distress. Calm and pleasant. SKIN:  Warm and dry, no rashes on exposed areas of skin   HEAD:  Normocephalic, atraumatic   EYES: PERRL. EOMs intact. EARS:  Equal bilaterally, no edema or thickening, skin is intact without lumps or lesions. No discharge. Hearing grossly WNL. NOSE:  Nares patent. No rhinorrhea   MOUTH/THROAT:  Mucous membranes moist, tongue is pink, uvula midline, teeth appear to be intact  NECK:Full ROM  LUNGS: Respirations even and non-labored. Clear to auscultation bilaterally, no wheezes, rales, or rhonchi. CARDIOVASCULAR: Regular rate and rhythm, no murmurs/rubs/gallops   ABDOMEN: soft, non-tender, non-distended, bowel sounds active x 4   EXTREMITIES: No edema bilateral lower extremities. No varicosities bilateral lower extremities. NEUROLOGIC: CN II-XII are grossly intact. Gait not assessed.   IMPRESSIONS:   GERD, HIATAL HERNIA  PLANS:   50 HOUR BOB EGD      BRENDON LOVELL CNP   Electronically signed 1/10/2023 at 10:03 AM

## 2023-01-10 ENCOUNTER — HOSPITAL ENCOUNTER (OUTPATIENT)
Age: 43
Setting detail: OUTPATIENT SURGERY
Discharge: HOME | End: 2023-01-10
Attending: SURGERY | Admitting: SURGERY
Payer: COMMERCIAL

## 2023-01-10 ENCOUNTER — ANESTHESIA (OUTPATIENT)
Dept: OPERATING ROOM | Age: 43
End: 2023-01-10
Payer: COMMERCIAL

## 2023-01-10 VITALS
OXYGEN SATURATION: 100 % | SYSTOLIC BLOOD PRESSURE: 132 MMHG | WEIGHT: 185 LBS | TEMPERATURE: 97.9 F | DIASTOLIC BLOOD PRESSURE: 80 MMHG | HEIGHT: 62 IN | RESPIRATION RATE: 17 BRPM | HEART RATE: 83 BPM | BODY MASS INDEX: 34.04 KG/M2

## 2023-01-10 DIAGNOSIS — K21.9 GASTROESOPHAGEAL REFLUX DISEASE WITHOUT ESOPHAGITIS: ICD-10-CM

## 2023-01-10 DIAGNOSIS — K44.9 HIATAL HERNIA: ICD-10-CM

## 2023-01-10 LAB
CONTROL: NORMAL
HCG, PREGNANCY URINE (POC): NEGATIVE
PREGNANCY TEST URINE, POC: NORMAL

## 2023-01-10 PROCEDURE — 7100000010 HC PHASE II RECOVERY - FIRST 15 MIN: Performed by: SURGERY

## 2023-01-10 PROCEDURE — 6360000002 HC RX W HCPCS

## 2023-01-10 PROCEDURE — 3700000000 HC ANESTHESIA ATTENDED CARE: Performed by: SURGERY

## 2023-01-10 PROCEDURE — 3700000001 HC ADD 15 MINUTES (ANESTHESIA): Performed by: SURGERY

## 2023-01-10 PROCEDURE — 2720000010 HC SURG SUPPLY STERILE: Performed by: SURGERY

## 2023-01-10 PROCEDURE — 43239 EGD BIOPSY SINGLE/MULTIPLE: CPT | Performed by: SURGERY

## 2023-01-10 PROCEDURE — 2709999900 HC NON-CHARGEABLE SUPPLY: Performed by: SURGERY

## 2023-01-10 PROCEDURE — 3609012400 HC EGD TRANSORAL BIOPSY SINGLE/MULTIPLE: Performed by: SURGERY

## 2023-01-10 PROCEDURE — 2580000003 HC RX 258

## 2023-01-10 PROCEDURE — 2500000003 HC RX 250 WO HCPCS

## 2023-01-10 PROCEDURE — 88305 TISSUE EXAM BY PATHOLOGIST: CPT

## 2023-01-10 PROCEDURE — 7100000000 HC PACU RECOVERY - FIRST 15 MIN: Performed by: SURGERY

## 2023-01-10 PROCEDURE — 3609019000 HC EGD CAPSULE ENDOSCOPY: Performed by: SURGERY

## 2023-01-10 PROCEDURE — 81025 URINE PREGNANCY TEST: CPT

## 2023-01-10 PROCEDURE — 7100000001 HC PACU RECOVERY - ADDTL 15 MIN: Performed by: SURGERY

## 2023-01-10 PROCEDURE — 2580000003 HC RX 258: Performed by: STUDENT IN AN ORGANIZED HEALTH CARE EDUCATION/TRAINING PROGRAM

## 2023-01-10 RX ORDER — LIDOCAINE HYDROCHLORIDE 10 MG/ML
INJECTION, SOLUTION EPIDURAL; INFILTRATION; INTRACAUDAL; PERINEURAL PRN
Status: DISCONTINUED | OUTPATIENT
Start: 2023-01-10 | End: 2023-01-10 | Stop reason: SDUPTHER

## 2023-01-10 RX ORDER — PROPOFOL 10 MG/ML
INJECTION, EMULSION INTRAVENOUS PRN
Status: DISCONTINUED | OUTPATIENT
Start: 2023-01-10 | End: 2023-01-10 | Stop reason: SDUPTHER

## 2023-01-10 RX ORDER — FENTANYL CITRATE 50 UG/ML
25 INJECTION, SOLUTION INTRAMUSCULAR; INTRAVENOUS EVERY 5 MIN PRN
Status: DISCONTINUED | OUTPATIENT
Start: 2023-01-10 | End: 2023-01-10 | Stop reason: HOSPADM

## 2023-01-10 RX ORDER — SODIUM CHLORIDE 9 MG/ML
INJECTION, SOLUTION INTRAVENOUS PRN
Status: DISCONTINUED | OUTPATIENT
Start: 2023-01-10 | End: 2023-01-10 | Stop reason: HOSPADM

## 2023-01-10 RX ORDER — SODIUM CHLORIDE 0.9 % (FLUSH) 0.9 %
5-40 SYRINGE (ML) INJECTION PRN
Status: DISCONTINUED | OUTPATIENT
Start: 2023-01-10 | End: 2023-01-10 | Stop reason: HOSPADM

## 2023-01-10 RX ORDER — SODIUM CHLORIDE 0.9 % (FLUSH) 0.9 %
5-40 SYRINGE (ML) INJECTION EVERY 12 HOURS SCHEDULED
Status: DISCONTINUED | OUTPATIENT
Start: 2023-01-10 | End: 2023-01-10 | Stop reason: HOSPADM

## 2023-01-10 RX ORDER — MIDAZOLAM HYDROCHLORIDE 1 MG/ML
INJECTION INTRAMUSCULAR; INTRAVENOUS PRN
Status: DISCONTINUED | OUTPATIENT
Start: 2023-01-10 | End: 2023-01-10 | Stop reason: SDUPTHER

## 2023-01-10 RX ORDER — SODIUM CHLORIDE, SODIUM LACTATE, POTASSIUM CHLORIDE, CALCIUM CHLORIDE 600; 310; 30; 20 MG/100ML; MG/100ML; MG/100ML; MG/100ML
INJECTION, SOLUTION INTRAVENOUS CONTINUOUS PRN
Status: DISCONTINUED | OUTPATIENT
Start: 2023-01-10 | End: 2023-01-10 | Stop reason: SDUPTHER

## 2023-01-10 RX ORDER — FENTANYL CITRATE 50 UG/ML
INJECTION, SOLUTION INTRAMUSCULAR; INTRAVENOUS PRN
Status: DISCONTINUED | OUTPATIENT
Start: 2023-01-10 | End: 2023-01-10 | Stop reason: SDUPTHER

## 2023-01-10 RX ORDER — SODIUM CHLORIDE, SODIUM LACTATE, POTASSIUM CHLORIDE, CALCIUM CHLORIDE 600; 310; 30; 20 MG/100ML; MG/100ML; MG/100ML; MG/100ML
INJECTION, SOLUTION INTRAVENOUS CONTINUOUS
Status: DISCONTINUED | OUTPATIENT
Start: 2023-01-10 | End: 2023-01-10 | Stop reason: HOSPADM

## 2023-01-10 RX ADMIN — SODIUM CHLORIDE, POTASSIUM CHLORIDE, SODIUM LACTATE AND CALCIUM CHLORIDE: 600; 310; 30; 20 INJECTION, SOLUTION INTRAVENOUS at 09:40

## 2023-01-10 RX ADMIN — PROPOFOL 350 MG: 10 INJECTION, EMULSION INTRAVENOUS at 10:27

## 2023-01-10 RX ADMIN — SODIUM CHLORIDE, POTASSIUM CHLORIDE, SODIUM LACTATE AND CALCIUM CHLORIDE: 600; 310; 30; 20 INJECTION, SOLUTION INTRAVENOUS at 10:17

## 2023-01-10 RX ADMIN — LIDOCAINE HYDROCHLORIDE 50 MG: 10 INJECTION, SOLUTION EPIDURAL; INFILTRATION; INTRACAUDAL; PERINEURAL at 10:26

## 2023-01-10 RX ADMIN — FENTANYL CITRATE 25 MCG: 50 INJECTION, SOLUTION INTRAMUSCULAR; INTRAVENOUS at 10:26

## 2023-01-10 RX ADMIN — MIDAZOLAM 2 MG: 1 INJECTION INTRAMUSCULAR; INTRAVENOUS at 10:22

## 2023-01-10 RX ADMIN — FENTANYL CITRATE 25 MCG: 50 INJECTION, SOLUTION INTRAMUSCULAR; INTRAVENOUS at 10:33

## 2023-01-10 ASSESSMENT — PAIN - FUNCTIONAL ASSESSMENT: PAIN_FUNCTIONAL_ASSESSMENT: NONE - DENIES PAIN

## 2023-01-10 NOTE — DISCHARGE INSTRUCTIONS
POST-ENDOSCOPY INSTRUCTIONS    1. ACTIVITY   No driving, operating machinery, or making important decisions for 24 hours. Resume normal activity after 24 hours. You may return to work after 24 hours. 2. DIET    EGD: Resume your usual diet unless specified below. Diet Modification: Regular    3. MEDICATIONS  (Do not consume alcohol, tranquilizers, or sleeping medications for 24 hours unless advised by your physician)                 Resume your usual medications. No acid reducing medications for 72 hours    4. PHYSICIAN FOLLOW-UP                 Please continue with your previously scheduled appointments                 See your primary care physician as planned. 6. NORMAL CHANGES YOU MAY EXPERIENCE AFTER ENDOSCOPY:      EGD:  Sore throat, some abdominal pain and cramping. 7. CALL YOUR PHYSICIAN IF YOU EXPERIENCE ANY OF THE FOLLOWING      A. Passing blood rectally or vomiting blood (color may be red or black)      B. Severe abdominal pain or tenderness (that is not relieved by passing air)      C.   Fever, chills, or excessive sweating      D.  Persistent nausea or vomiting      E.  Redness or swelling at the IV site    If you have additional questions, PLEASE call your doctor or the [facility] at [phone]

## 2023-01-10 NOTE — ANESTHESIA POSTPROCEDURE EVALUATION
Department of Anesthesiology  Postprocedure Note    Patient: Rosi Pena  MRN: [id number]  YOB: 1980  Date of evaluation: 1/10/2023      Procedure Summary     Date: 01/10/23 Room / Location: [address]    Anesthesia Start: 1021 Anesthesia Stop: 1045    Procedures:       ESOPHAGEAL CAPSULE ENDOSCOPY      EGD BIOPSY Diagnosis:       Gastroesophageal reflux disease without esophagitis      Hiatal hernia      (GERD, HIATAL HERNIA)    Surgeons: Surjit Cortes DO Responsible Provider: Ming Smith MD    Anesthesia Type: MAC ASA Status: 2          Anesthesia Type: No value filed.    Joshua Phase I: Joshua Score: 9    Joshua Phase II:        Anesthesia Post Evaluation    Patient location during evaluation: bedside  Patient participation: complete - patient participated  Level of consciousness: awake  Airway patency: patent  Nausea & Vomiting: no nausea and no vomiting  Complications: no  Cardiovascular status: hemodynamically stable  Respiratory status: acceptable  Hydration status: stable  Comments: BP (!) 96/57   Pulse 81   Temp 97.5 °F (36.4 °C) (Temporal)   Resp 17   Ht 5' 2\" (1.575 m)   Wt 185 lb (83.9 kg)   LMP 12/12/2022 (Approximate) Comment: neg hcg  SpO2 94%   BMI 33.84 kg/m²

## 2023-01-10 NOTE — ANESTHESIA PRE PROCEDURE
Department of Anesthesiology  Preprocedure Note       Name:  Chary Terrell   Age:  38 y.o.  :  1980                                          MRN:  [id number]         Date:  1/10/2023      Surgeon: Angelita Adams):  Jesse Malloy DO    Procedure: Procedure(s):  50 HOUR BRAVO EGD ESOPHAGOGASTRODUODENOSCOPY - GI SCHEDULED    Medications prior to admission:   Prior to Admission medications    Medication Sig Start Date End Date Taking? Authorizing Provider   acetaminophen (TYLENOL) 500 MG tablet Take 500 mg by mouth every 6 hours as needed for Pain    Historical Provider, MD   omeprazole (PRILOSEC) 20 MG delayed release capsule Take 1 capsule by mouth 2 times daily (before meals) 10/21/22   Jesse Malloy DO   PARoxetine (PAXIL) 10 MG tablet Take 1 tablet by mouth daily 22   BRENDON Bell CNP   cetirizine (ZYRTEC) 10 MG tablet TAKE ONE TABLET BY MOUTH DAILY 10/21/20   BRENDON Meza CNP   triamcinolone (NASACORT) 55 MCG/ACT nasal inhaler 2 sprays by Nasal route daily 19   BRENDON Meza CNP   albuterol sulfate HFA (PROAIR HFA) 108 (90 Base) MCG/ACT inhaler Inhale 2 puffs into the lungs every 6 hours as needed for Wheezing 18   BRENDON Meza CNP       Current medications:    No current facility-administered medications for this visit. No current outpatient medications on file. Facility-Administered Medications Ordered in Other Visits   Medication Dose Route Frequency Provider Last Rate Last Admin    lactated ringers infusion   IntraVENous Continuous Stephanie Carvalho MD           Allergies:  No Known Allergies    Problem List:    Patient Active Problem List   Diagnosis Code    Depression F32. A    Elevated antinuclear antibody (MICKY) level R76.8    Elevated C-reactive protein (CRP) R79.82    HPV in female (OTHER) B97.7    GERD (gastroesophageal reflux disease) K21.9    Nausea and vomiting R11.2    Family history of colon cancer Z80.0    Weight loss R63.4    Esophageal dysphagia R13.19    Hematemesis/vomiting blood K92.0    Rectal bleeding K62.5    Salgado's esophagus K22.70    Erosive gastritis K29.60    Non-intractable vomiting R11.10    Gastroesophageal reflux disease with esophagitis without hemorrhage K21.00    H/O LEEP Z98.890    H/O tubal ligation Z98.51       Past Medical History:        Diagnosis Date    Abnormal Pap smear of cervix     follows with [facility]    Anxiety     Asthma     physically induced    Salgado's esophagus 02/05/2021    Depression     teen    Endometriosis     hx.  Erosive gastritis 02/05/2021    Esophageal dysphagia     Family history of colon cancer 01/21/2021    Maternal grandfather    GERD (gastroesophageal reflux disease)     Hematemesis/vomiting blood     x1    Nausea and vomiting     every day    Rectal bleeding     Unintended weight loss        Past Surgical History:        Procedure Laterality Date   [address]    for endometriosis.  COLONOSCOPY N/A 02/05/2021    COLONOSCOPY DIAGNOSTIC performed by Kobe Barajas MD at [address] [facility]     scope bursitis     TUBAL LIGATION      UPPER GASTROINTESTINAL ENDOSCOPY N/A 02/05/2021    SALGADO'S    WISDOM TOOTH EXTRACTION         Social History:    Social History     Tobacco Use    Smoking status: Never    Smokeless tobacco: Never    Tobacco comments:     Few cigarettes a long time ago   Substance Use Topics    Alcohol use: Not Currently                                Counseling given: Not Answered  Tobacco comments: Few cigarettes a long time ago      Vital Signs (Current): There were no vitals filed for this visit.                                            BP Readings from Last 3 Encounters:   01/10/23 134/89   11/17/22 128/88   10/21/22 128/76       NPO Status:                                                                                 BMI:   Wt Readings from Last 3 Encounters:   01/10/23 185 lb (83.9 kg) 11/17/22 190 lb (86.2 kg)   10/21/22 189 lb (85.7 kg)     There is no height or weight on file to calculate BMI.    CBC:   Lab Results   Component Value Date/Time    WBC 8.7 09/30/2022 10:19 AM    RBC 4.60 09/30/2022 10:19 AM    HGB 13.1 09/30/2022 10:19 AM    HCT 40.5 09/30/2022 10:19 AM    MCV 88.2 09/30/2022 10:19 AM    RDW 12.6 09/30/2022 10:19 AM     09/30/2022 10:19 AM       CMP:   Lab Results   Component Value Date/Time     09/30/2022 10:19 AM    K 4.1 09/30/2022 10:19 AM     09/30/2022 10:19 AM    CO2 24 09/30/2022 10:19 AM    BUN 13 09/30/2022 10:19 AM    CREATININE 0.57 09/30/2022 10:19 AM    GFRAA >60 09/30/2022 10:19 AM    LABGLOM >60 09/30/2022 10:19 AM    GLUCOSE 87 09/30/2022 10:19 AM    PROT 7.2 09/30/2022 10:19 AM    CALCIUM 9.1 09/30/2022 10:19 AM    BILITOT 0.4 09/30/2022 10:19 AM    ALKPHOS 67 09/30/2022 10:19 AM    AST 15 09/30/2022 10:19 AM    ALT 16 09/30/2022 10:19 AM       POC Tests: No results for input(s): POCGLU, POCNA, POCK, POCCL, POCBUN, POCHEMO, POCHCT in the last 72 hours.     Coags:   Lab Results   Component Value Date/Time    PROTIME 9.5 03/23/2021 04:30 PM    INR 0.9 03/23/2021 04:30 PM    APTT 25.6 03/23/2021 04:30 PM       HCG (If Applicable):   Lab Results   Component Value Date    PREGTESTUR negative 02/26/2020    HCG NEGATIVE 02/05/2021    HCGQUANT <1 03/23/2021        ABGs: No results found for: PHART, PO2ART, XKF1WOX, QEB3GST, BEART, G8MRTGPG     Type & Screen (If Applicable):  No results found for: LABABO, LABRH    Drug/Infectious Status (If Applicable):  No results found for: HIV, HEPCAB    COVID-19 Screening (If Applicable):   Lab Results   Component Value Date/Time    COVID19 Not Detected 02/01/2021 01:35 PM    COVID19 Not Detected 11/11/2020 06:45 AM         Anesthesia Evaluation  Patient summary reviewed and Nursing notes reviewed no history of anesthetic complications:   Airway: Mallampati: I  TM distance: >3 FB   Neck ROM: full  Mouth opening: > = 3 FB   Dental: normal exam         Pulmonary:normal exam    (+) asthma:                            Cardiovascular:Negative CV ROS                      Neuro/Psych:   (+) depression/anxiety             GI/Hepatic/Renal:   (+) GERD:,           Endo/Other: Negative Endo/Other ROS                    Abdominal:             Vascular: negative vascular ROS. Other Findings:             Anesthesia Plan      MAC     ASA 2             Anesthetic plan and risks discussed with patient. Use of blood products discussed with patient whom consented to blood products. Plan discussed with CRNA.                     Lindsay Wagner MD   1/10/2023

## 2023-01-10 NOTE — OP NOTE
[address] [facility] [address]  Dept: [phone]  Loc: [phone]    Preoperative Diagnosis:  GERD    Postoperative Diagnosis: GERD with hiatal hernia    Procedure: Esophagogastroduodenoscopy with Biopsy, BRAVO Catheter Placement    Surgeon: Windy Connell DO    Assistant:    Anesthesia: MAC, see anesthesia records    Specimen:    1) Antrum for H. Pylori      Findings:  Diffuse mild gastritis, Small sliding hiatal hernia, normal duodenum, normal esophagus    EBL: NONE    Operative Narrative: The risks and benefits were explained in detail to the patient who agreed and consented to the procedure. The patient was taken to the endoscopic suite and placed in a lateral position. Oxygen was administered via nasal cannula and a mouth guard was placed. MAC was administered via the anesthetic team.      The endoscope was then advanced into the oropharynx and down into the esophagus under direct visualization. The scope was further advanced through the esophagus, GE junction and stomach to the pylorus under visualization. The scope was passed through the pylorus and duodenal sweep performed, advancing and visualizing to the second portion of the duodenum. The scope was then withdrawn to the antrum and cold forceps were used to take biopsies of the antrum for H. Pylori. Appropriate hemostasis was noted. The scope was then retroflexed to visualize the GE junction. Evidence of a hiatal hernia was noted. The scope was then slowly withdrawn through the GE junction. The Z line was noted. The stomach was then decompressed. The scope was withdrawn from the esophagus and no further lesions noted. BRAVO advanced to 5 cm above GE junction. Catheter confirmed in esophagus with endoscopy. Catheter then deployed per 's recommendations. Catheter again confirmed in esophagus without evidence of trauma. The scope was withdrawn.   The patient tolerated the procedure well. BRAVO instructions given to patient    She will follow up with the [facility] for further assessment.

## 2023-01-11 LAB — SURGICAL PATHOLOGY REPORT: NORMAL

## 2023-01-25 ENCOUNTER — NURSE ONLY (OUTPATIENT)
Dept: BARIATRICS/WEIGHT MGMT | Age: 43
End: 2023-01-25

## 2023-01-25 DIAGNOSIS — K21.9 HIATAL HERNIA WITH GERD: Primary | ICD-10-CM

## 2023-01-25 DIAGNOSIS — K44.9 HIATAL HERNIA WITH GERD: Primary | ICD-10-CM

## 2023-01-25 PROCEDURE — 99999 PR OFFICE/OUTPT VISIT,PROCEDURE ONLY: CPT | Performed by: SURGERY

## 2023-01-26 ENCOUNTER — OFFICE VISIT (OUTPATIENT)
Dept: BARIATRICS/WEIGHT MGMT | Age: 43
End: 2023-01-26
Payer: COMMERCIAL

## 2023-01-26 VITALS
HEIGHT: 62 IN | SYSTOLIC BLOOD PRESSURE: 138 MMHG | BODY MASS INDEX: 35.51 KG/M2 | WEIGHT: 193 LBS | DIASTOLIC BLOOD PRESSURE: 88 MMHG | HEART RATE: 93 BPM

## 2023-01-26 DIAGNOSIS — K44.9 HIATAL HERNIA WITH GERD: Primary | ICD-10-CM

## 2023-01-26 DIAGNOSIS — K21.9 HIATAL HERNIA WITH GERD: Primary | ICD-10-CM

## 2023-01-26 DIAGNOSIS — R11.2 INTRACTABLE NAUSEA AND VOMITING: ICD-10-CM

## 2023-01-26 DIAGNOSIS — E66.9 OBESITY (BMI 30-39.9): ICD-10-CM

## 2023-01-26 PROCEDURE — 99214 OFFICE O/P EST MOD 30 MIN: CPT | Performed by: SURGERY

## 2023-01-26 RX ORDER — FAMOTIDINE 20 MG/1
20 TABLET, FILM COATED ORAL 2 TIMES DAILY
Qty: 60 TABLET | Refills: 3 | Status: SHIPPED | OUTPATIENT
Start: 2023-01-26

## 2023-01-26 NOTE — PROGRESS NOTES
[address] [facility] MIN INVASIVE BARIATRIC SURG  [address]  [address] [facility]  Dept: [phone]    ROBOTIC AND MINIMALLY INVASIVE SURGERY  PROGRESS NOTE FOLLOW UP     Patient: Margarita Briggs        Service Date: 1/26/2023      HPI:     Chief Complaint   Patient presents with    Hiatal Hernia     Egd and bravo follow up       38 y.o. female who presents today for a hiatal hernia. She reports vomiting after most of her meals for the past 5 years. She states that she has started taking Prilosec 20 mg, with minimal improvement. She denies dysphagia and abdominal pain. The patient denies  a history of myocardial infarction, deep vein thrombosis, pulmonary embolism, renal failure, hepatic failure, stroke, and seizure. Previous imaging/testing:  She had an EGD on 1/10/2023, which showed \" Diffuse mild gastritis, Small sliding hiatal hernia, normal duodenum, normal esophagus. EBL: NONE. \"    Patient had an FL UGI on 11/15/2022, which showed \"Small transient hiatal hernia while prone/supine and small volume reflux while supine. \"    She had a CT of the abdomen and pelvis on 11/28/2022, which showed \"No acute abnormality. \"    She had an US of the gallbladder RUQ on 10/25/22, which was normal. Her gastric emptying study from 10/31/22 was also normal.    Medical History:  Past Medical History:   Diagnosis Date    Abnormal Pap smear of cervix     follows with Linda    Anxiety     Asthma     physically induced    Flores's esophagus 02/05/2021    Depression     teen    Endometriosis     hx.     Erosive gastritis 02/05/2021    Esophageal dysphagia     Family history of colon cancer 01/21/2021    Maternal grandfather    GERD (gastroesophageal reflux disease)     Headache 2023    recent hisotry of headaches    Hematemesis/vomiting blood     x1    History of COVID-19 2021    asymptomatic    History of kidney stones     at least 12 years ago, unable to recall if passed on own    Nausea and vomiting     every day    Rectal bleeding     Unintended weight loss        Surgical History:  Past Surgical History:   Procedure Laterality Date    [address]    for endometriosis.     CAPSULE ENDOSCOPY N/A 1/10/2023    ESOPHAGEAL CAPSULE ENDOSCOPY performed by Kong Garcia DO at [address] N/A 02/05/2021    COLONOSCOPY DIAGNOSTIC performed by Herve Newby MD at [facility] ENDO    ESOPHAGOGASTRODUODENOSCOPY N/A 01/10/2023    ESOPHAGEAL CAPSULE ENDOSCOPY    KNEE SURGERY Right     scope bursitis     TUBAL LIGATION      UPPER GASTROINTESTINAL ENDOSCOPY N/A 02/05/2021    [facility]    UPPER GASTROINTESTINAL ENDOSCOPY  1/10/2023    EGD BIOPSY performed by Kong Garcia DO at [address]         Family History:      Problem Relation Age of Onset    Arthritis Mother     Diabetes Mother     High Cholesterol Mother     Arthritis Father     Diabetes Father     High Cholesterol Father     Arthritis Sister     Arthritis Brother     Arthritis Maternal Aunt     Arthritis Maternal Uncle     Arthritis Paternal Aunt     Arthritis Paternal Uncle     Arthritis Maternal Grandmother     Asthma Maternal Grandmother     Diabetes Maternal Grandmother     Heart Disease Maternal Grandmother         CHF    High Blood Pressure Maternal Grandmother     Anemia Maternal Grandfather     Arthritis Maternal Grandfather     Colon Cancer Maternal Grandfather     Prostate Cancer Maternal Grandfather     Cancer Maternal Grandfather         skin    High Blood Pressure Maternal Grandfather     Arthritis Paternal Grandmother     Cancer Paternal Grandmother         stomach    Stroke Paternal Grandmother     Asthma Maternal Cousin     Other Other         Possible hx of blood clots       Social History:   Social History     Tobacco Use    Smoking status: Never    Smokeless tobacco: Never    Tobacco comments:     Few cigarettes a long time ago   Vaping Use Vaping Use: Never used   Substance Use Topics    Alcohol use: Not Currently    Drug use: Never       Current Med List:  Current Outpatient Medications   Medication Sig Dispense Refill    famotidine (PEPCID) 20 MG tablet Take 1 tablet by mouth 2 times daily 60 tablet 3    acetaminophen (TYLENOL) 500 MG tablet Take 500 mg by mouth every 6 hours as needed for Pain      omeprazole (PRILOSEC) 20 MG delayed release capsule Take 1 capsule by mouth 2 times daily (before meals) 180 capsule 1    PARoxetine (PAXIL) 10 MG tablet Take 1 tablet by mouth daily 30 tablet 3    cetirizine (ZYRTEC) 10 MG tablet TAKE ONE TABLET BY MOUTH DAILY 30 tablet 5    triamcinolone (NASACORT) 55 MCG/ACT nasal inhaler 2 sprays by Nasal route daily 1 Inhaler 0    albuterol sulfate HFA (PROAIR HFA) 108 (90 Base) MCG/ACT inhaler Inhale 2 puffs into the lungs every 6 hours as needed for Wheezing 1 Inhaler 3     No current facility-administered medications for this visit. SOCIAL:      This patient is alone for the evaluation today.       [] HIV Risk Factors (i.e.) intravenous drug abuser; at risk sexual behavior; received blood products    [] TB Risk Factors (i.e.) Medically underserved, institutional care, foreign born, endemic area; exposure to active case    [] Hepatitis B&C Risk Factors (i.e.) Received blood transfusion prior to 1992; recreational drug use; high risk sexual behaviors; tattoos or body piercings; contact with blood or needle sticks in the workplace    Comprehension    Ability to grasp concepts and respond to questions:   [x] High   [] Medium   [] Low    Motivation    [x] Asks Questions; eager to learn   [] Needs education   [] Extreme anxiety    [] uncooperative   [] Denies need for education    English Speaking Ability    [x] Speaks English well   [x] Reads English well   [x] Understands spoken english    [x] Understands written English   [] No need for interpretive support      [] Might benefit from interpretive support []  required for all services     REVIEW OF SYSTEMS: (Negative unless marked otherwise)     See review of Systems scanned into media    PRESENT ILLNESS:     Weight Parameters  Weight 193 lb (87.5 kg)   Height 5' 2\" (1.575 m)   BMI Body mass index is 35.3 kg/m². IBW     EBW               IMMUNIZATION STATUS  Immunization History   Administered Date(s) Administered    COVID-19, MODERNA BLUE border, Primary or Immunocompromised, (age 12y+), IM, 100 mcg/0.5mL 12/30/2020, 01/27/2021    Tdap (Boostrix, Adacel) 02/09/2018           Physician Review    [x] Past medical, family, & social history reviewed and discussed with patient. Review of surgery and post-surgical changes (by surgeon for surgical patients only)    [x] Lifelong diet expectations reviewed with patient    PHYSICAL EXAMINATION:      /88 (Site: Right Upper Arm, Position: Sitting, Cuff Size: Large Adult)   Pulse 93   Ht 5' 2\" (1.575 m)   Wt 193 lb (87.5 kg)   BMI 35.30 kg/m²     Constitutional:  Vital signs are normal. The patient appears well-developed   HEENT:      Head: Normocephalic. Atraumatic     Eyes: pupils are equal and reactive. No scleral icterus is present. Neck: No mass and no thyromegaly present. Cardiovascular: Normal rate, regular rhythm, S1 normal and S2 normal.  Bilateral pulses present. Pulmonary/Chest: Effort normal and breath sounds normal. No retractions. Abdominal: Soft. Normal appearance. There is no organomegaly. No tenderness. There is no rigidity, no rebound, no guarding and no De La Torre's sign. Musculoskeletal:      Right lower leg: Normal. No tenderness and no edema. Left lower leg: Normal. No tenderness and no edema. Lymphadenopathy:     No cervical adenopathy, No Exrtemity Adenopathy. Neurological: The patient is alert and oriented. Moving all four extremities equally, sensation grossly intact bilateral.  Skin: Skin is warm, dry and intact.    Psychiatric: The patient has a normal mood and affect. Speech is normal and behavior is normal. Judgment and thought content normal. Cognition and memory are normal.     RECOMMENDATIONS:     We spent a great deal of time discussing the risks and benefits of  hiatal hernia repair , including but not limited to injury to intra-abdominal organs, breakdown of the gastric staple line, the need for re-operative therapy,  prolonged hospitalization,  mechanical ventilation,  and death. We discussed the possibility of bleeding, the need for blood transfusions, blood clots, hospital-acquired and intra-abdominal infection, anastomotic stricture, and worsening GERD. Discussed the risk of substance abuse and or nicotine abuse today with patient. They expressed understanding of the risks of abuse of such drugs. PLAN:       Diagnosis Orders   1. Hiatal hernia with GERD        2. Intractable nausea and vomiting  NM HEPATOBILIARY SCAN W EJECTION FRACTION      3. Obesity (BMI 30-39. 9)               Plan:    Primary Procedure:  Hiatal hernia repair. Discussed TIF procedure. Reviewed EGD from 1/10/2023, BRAVO consistent with reflux. She states her nausea persists. Reviewed FL UGI from 11/15/2022  Reviewed CT of the abdomen and pelvis from 11/28/2022  Reviewed US of the gallbladder RUQ from 10/25/22  Reviewed gastric emptying study from 10/31/22  Ordered HIDA scan with persistent nausea  Continue taking Prilosec 20 mg and start taking Pepcid 20 mg, as prescribed  Discussed corona and wellness classes  Rx for Pepcid placed today as symptoms are consistent with reflux at this point    Follow up 2-3 months    We discussed weight loss given obesity. Ideally this would improve her GERD. Weight loss program information provided to patient today    Scribe Attestation:  Keysha Ochoa, scribed on behalf of Isa Medina DO.     Electronically signed by Isa Medina DO on 1/26/2023 at 7:58 AM    Isa BOONE DO DO, personally performed the services described in this documentation. All medical record entries made by the scribe were at my direction and in my presence. I have reviewed the chart and discharge instructions (if applicable) and agree that the record reflects my personal performance and is accurate and complete.     Electronically Signed: Harry Noble DO. 01/29/23. 6:54 PM.

## 2023-02-03 NOTE — PROGRESS NOTES
BRAVO pH Catheter Interpretation    Study Time 48 hrs    Longest Reflux 15.8  Demeester score 48.5  Acid Exposure time 14.8    Symptom Association Summary:  .0 Burning  .0 Burning, 93.4 Vomiting, 94.5 bloating    Number of long refluxes 18    Day 1 Demeester 45.6  Day 2 Demeester 50.4    Assessment:  Findings consistent with Reflux

## 2023-02-17 ENCOUNTER — OFFICE VISIT (OUTPATIENT)
Dept: OBGYN CLINIC | Age: 43
End: 2023-02-17

## 2023-02-17 ENCOUNTER — HOSPITAL ENCOUNTER (OUTPATIENT)
Age: 43
Setting detail: SPECIMEN
Discharge: HOME | End: 2023-02-17

## 2023-02-17 VITALS
BODY MASS INDEX: 34.96 KG/M2 | SYSTOLIC BLOOD PRESSURE: 108 MMHG | DIASTOLIC BLOOD PRESSURE: 68 MMHG | HEIGHT: 62 IN | WEIGHT: 190 LBS

## 2023-02-17 DIAGNOSIS — Z01.419 WELL FEMALE EXAM WITH ROUTINE GYNECOLOGICAL EXAM: Primary | ICD-10-CM

## 2023-02-17 DIAGNOSIS — Z12.31 ENCOUNTER FOR SCREENING MAMMOGRAM FOR MALIGNANT NEOPLASM OF BREAST: ICD-10-CM

## 2023-02-17 DIAGNOSIS — Z11.51 SPECIAL SCREENING EXAMINATION FOR HUMAN PAPILLOMAVIRUS (HPV): ICD-10-CM

## 2023-02-17 ASSESSMENT — PATIENT HEALTH QUESTIONNAIRE - PHQ9
4. FEELING TIRED OR HAVING LITTLE ENERGY: 0
10. IF YOU CHECKED OFF ANY PROBLEMS, HOW DIFFICULT HAVE THESE PROBLEMS MADE IT FOR YOU TO DO YOUR WORK, TAKE CARE OF THINGS AT HOME, OR GET ALONG WITH OTHER PEOPLE: 0
9. THOUGHTS THAT YOU WOULD BE BETTER OFF DEAD, OR OF HURTING YOURSELF: 0
SUM OF ALL RESPONSES TO PHQ QUESTIONS 1-9: 0
3. TROUBLE FALLING OR STAYING ASLEEP: 0
7. TROUBLE CONCENTRATING ON THINGS, SUCH AS READING THE NEWSPAPER OR WATCHING TELEVISION: 0
1. LITTLE INTEREST OR PLEASURE IN DOING THINGS: 0
SUM OF ALL RESPONSES TO PHQ QUESTIONS 1-9: 0
6. FEELING BAD ABOUT YOURSELF - OR THAT YOU ARE A FAILURE OR HAVE LET YOURSELF OR YOUR FAMILY DOWN: 0
SUM OF ALL RESPONSES TO PHQ9 QUESTIONS 1 & 2: 0
5. POOR APPETITE OR OVEREATING: 0
8. MOVING OR SPEAKING SO SLOWLY THAT OTHER PEOPLE COULD HAVE NOTICED. OR THE OPPOSITE, BEING SO FIGETY OR RESTLESS THAT YOU HAVE BEEN MOVING AROUND A LOT MORE THAN USUAL: 0
SUM OF ALL RESPONSES TO PHQ QUESTIONS 1-9: 0
2. FEELING DOWN, DEPRESSED OR HOPELESS: 0
SUM OF ALL RESPONSES TO PHQ QUESTIONS 1-9: 0

## 2023-02-17 NOTE — PROGRESS NOTES
History and Physical  [address], [address] [facility] [address], [address]. [phone]   Fax [phone]  Pacheco Domingo  2023              38 y.o. Chief Complaint   Patient presents with    Annual Exam       Patient's last menstrual period was 2023. Primary Care Physician: BRENDON Alfonso CNP    The patient was seen and examined. She has no chief complaint today and is here for her annual exam.  Her bowels are regular. There are no voiding complaints. She denies any bloating. She denies vaginal discharge and was counseled on STD's and the need for barrier contraception. HPI : Pacheco Domingo is a 38 y.o. female     Annual exam  Complaining of hot flashes, night sweats. Family physician placed on paxil for mood swings 5 months ago. Has helped with hot flashes and mood swings. Feels like things are in balance. Denies needing anything further. Periods are occurring every 1-2 months, lasting couple days long. Hx of endometrial ablation. Hx of LEEP      ________________________________________________________________________  OB History    Para Term  AB Living   2 2 2 0 0 2   SAB IAB Ectopic Molar Multiple Live Births   0 0 0 0 0 0      # Outcome Date GA Lbr Geovani/2nd Weight Sex Delivery Anes PTL Lv   2 Term            1 Term              Past Medical History:   Diagnosis Date    Abnormal Pap smear of cervix     follows with Linda    Anxiety     Asthma     physically induced    Flores's esophagus 2021    Depression     teen    Endometriosis     hx.     Erosive gastritis 2021    Esophageal dysphagia     Family history of colon cancer 2021    Maternal grandfather    GERD (gastroesophageal reflux disease)     Headache     recent hisotry of headaches    Hematemesis/vomiting blood     x1    History of COVID-2021    asymptomatic    History of kidney stones     at least 12 years ago, unable to recall if passed on own    Nausea and vomiting     every day    Rectal bleeding     Unintended weight loss                                                                    Past Surgical History:   Procedure Laterality Date    [facility] [place]    for endometriosis.     CAPSULE ENDOSCOPY N/A 1/10/2023    ESOPHAGEAL CAPSULE ENDOSCOPY performed by Tyrel Monahan DO at [facility] N/A 02/05/2021    COLONOSCOPY DIAGNOSTIC performed by Myla Bonilla MD at [facility] ENDO    ESOPHAGOGASTRODUODENOSCOPY N/A 01/10/2023    ESOPHAGEAL CAPSULE ENDOSCOPY    KNEE SURGERY Right     scope bursitis     TUBAL LIGATION      UPPER GASTROINTESTINAL ENDOSCOPY N/A 02/05/2021    [facility]    UPPER GASTROINTESTINAL ENDOSCOPY  1/10/2023    EGD BIOPSY performed by Tyrel Monahan DO at [address]       Family History   Problem Relation Age of Onset    Arthritis Mother     Diabetes Mother     High Cholesterol Mother     Arthritis Father     Diabetes Father     High Cholesterol Father     Arthritis Sister     Arthritis Brother     Arthritis Maternal Aunt     Arthritis Maternal Uncle     Arthritis Paternal Aunt     Arthritis Paternal Uncle     Arthritis Maternal Grandmother     Asthma Maternal Grandmother     Diabetes Maternal Grandmother     Heart Disease Maternal Grandmother         CHF    High Blood Pressure Maternal Grandmother     Anemia Maternal Grandfather     Arthritis Maternal Grandfather     Colon Cancer Maternal Grandfather     Prostate Cancer Maternal Grandfather     Cancer Maternal Grandfather         skin    High Blood Pressure Maternal Grandfather     Arthritis Paternal Grandmother     Cancer Paternal Grandmother         stomach    Stroke Paternal Grandmother     Asthma Maternal Cousin     Other Other         Possible hx of blood clots     Social History     Socioeconomic History    Marital status:      Spouse name: Not on file    Number of children: Not on file    Years of education: Not on file    Highest education level: Not on file   Occupational History    Not on file   Tobacco Use    Smoking status: Never    Smokeless tobacco: Never    Tobacco comments:     Few cigarettes a long time ago   Vaping Use    Vaping Use: Never used   Substance and Sexual Activity    Alcohol use: Not Currently    Drug use: Never    Sexual activity: Not on file   Other Topics Concern    Not on file   Social History Narrative    Not on file     Social Determinants of Health     Financial Resource Strain: Low Risk     Difficulty of Paying Living Expenses: Not hard at all   Food Insecurity: No Food Insecurity    Worried About Running Out of Food in the Last Year: Never true    Ran Out of Food in the Last Year: Never true   Transportation Needs: Not on file   Physical Activity: Not on file   Stress: Not on file   Social Connections: Not on file   Intimate Partner Violence: Not on file   Housing Stability: Not on file       MEDICATIONS:  Current Outpatient Medications   Medication Sig Dispense Refill    PARoxetine (PAXIL) 10 MG tablet TAKE ONE TABLET BY MOUTH DAILY 30 tablet 3    famotidine (PEPCID) 20 MG tablet Take 1 tablet by mouth 2 times daily 60 tablet 3    acetaminophen (TYLENOL) 500 MG tablet Take 500 mg by mouth every 6 hours as needed for Pain      omeprazole (PRILOSEC) 20 MG delayed release capsule Take 1 capsule by mouth 2 times daily (before meals) 180 capsule 1    cetirizine (ZYRTEC) 10 MG tablet TAKE ONE TABLET BY MOUTH DAILY 30 tablet 5    triamcinolone (NASACORT) 55 MCG/ACT nasal inhaler 2 sprays by Nasal route daily 1 Inhaler 0    albuterol sulfate HFA (PROAIR HFA) 108 (90 Base) MCG/ACT inhaler Inhale 2 puffs into the lungs every 6 hours as needed for Wheezing 1 Inhaler 3     No current facility-administered medications for this visit.            ALLERGIES:  Allergies as of 02/17/2023    (No Known Allergies)       Symptoms of decreased mood absent  Symptoms of anhedonia absent    **If either question is answered in a  positive fashion then complete the PHQ9 Scoring Evaluation and make the appropriate referral**      Immunization status: stated as current, but no records available. Gynecologic History:  Menarche: 13 yo  Menopause at [address] [place] [place] yo     Patient's last menstrual period was 01/23/2023. Sexually Active: Yes    STD History: No     Permanent Sterilization: Yes TL   Reversible Birth Control: No        Hormone Replacement Exposure: No      Genetic Qualified Family History of Breast, Ovarian , Colon or Uterine Cancer: No     If YES see scanned worksheet. Preventative Health Testing:    Health Maintenance:  Health Maintenance Due   Topic Date Due    HIV screen  Never done    Hepatitis C screen  Never done    COVID-19 Vaccine (4 - Booster for Moderna series) 01/31/2022       Date of Last Pap Smear: 3/23/2021 neg/neg  Abnormal Pap Smear History: yes, hx of LEEP  Colposcopy History:   Date of Last Mammogram: 10/21/2022 normal  Date of Last Colonoscopy:   Date of Last Bone Density:      ________________________________________________________________________        REVIEW OF SYSTEMS:    yes   A minimum of an eleven point review of systems was completed. Review Of Systems (11 point):  Constitutional: No fever, chills or malaise; No weight change or fatigue  Head and Eyes: No vision changes, Headache, Dizziness or trauma in last 12 months  ENT ROS: No hearing, Tinnitis, sinus or taste problems  Hematological and Lymphatic ROS:No Lymphoma, Von Willebrand's, Hemophillia or Bleeding History  Psych ROS: No Depression, Homicidal thoughts,suicidal thoughts, or anxiety  Breast ROS: No breast abnormalities or lumps  Respiratory ROS: No SOB, Pneumoniae,Cough, or Pulmonary Embolism   Cardiovascular ROS: No Chest Pain with Exertion, Palpitations, Syncope, Edema, Arrhythmia  Gastrointestinal ROS: No Indigestion, Heartburn, Nausea, vomiting, Diarrhea, Constipation,or Bowel Changes;  No Bloody Stools or melena  Genito-Urinary ROS: No Dysuria, Hematuria or Nocturia. No Urinary Incontinence or Vaginal Discharge  Musculoskeletal ROS: No Arthralgia, Arthritis,Gout,Osteoporosis or Rheumatism  Neurological ROS: No CVA, Migraines, Epilepsy, Seizure Hx, or Limb Weakness  Dermatological ROS: No Rash, Itching, Hives, Mole Changes or Cancer                                                                                                                                                                                                                                  PHYSICAL Exam:     Constitutional:  Vitals:    02/17/23 0808   BP: 108/68   Site: Right Upper Arm   Position: Sitting   Cuff Size: Medium Adult   Weight: 190 lb (86.2 kg)   Height: 5' 2\" (1.575 m)       Chaperone for Intimate Exam  Chaperone was offered and accepted as part of the rooming process. Chaperone: Jodi Flores Appearance: This  is a well Developed, well Nourished, well groomed female. Her BMI was reviewed. Nutritional decision making was discussed. Skin:  There was a Normal Inspection of the skin without rashes or lesions. There were no rashes. (Papular, Maculopapular, Hives, Pustular, Macular)     There were no lesions (Ulcers, Erythema, Abn. Appearing Nevi)            Lymphatic:  No Lymph Nodes were Palpable in the neck , axilla or groin.  0 # Of Lymph Nodes; Location ; Character [Normal]  [Shotty] [Tender] [Enlarged]     Neck and EENT:  The neck was supple. There were no masses   The thyroid was not enlarged and had no masses. Perrla, EOMI B/L, TMI B/L No Abnormalities. Throat inspected-No exudates or Masses, Nares Patent No Masses        Respiratory: The lungs were auscultated and found to be clear. There were no rales, rhonchi or wheezes. There was a good respiratory effort. Cardiovascular: The heart was in a regular rate and rhythm. . No S3 or S4. There was no murmur appreciated.  Location, grade, and radiation are not applicable. Extremities: The patients extremities were without calf tenderness, edema, or varicosities. There was full range of motion in all four extremities. Pulses in all four extremities were appreciated and are 2/4. Abdomen: The abdomen was soft and non-tender. There were good bowel sounds in all quadrants and there was no guarding, rebound or rigidity. On evaluation there was no evidence of hepatosplenomegaly and there was no costal vertebral taylor tenderness bilaterally. No hernias were appreciated. Abdominal Scars: lap scars    Psych: The patient had a normal Orientation to: Time, Place, Person, and Situation  There is no Mood / Affect changes    Breast:  (Chest)  normal appearance, no masses or tenderness, No nipple retraction or dimpling, No nipple discharge or bleeding, No axillary or supraclavicular adenopathy, Normal to palpation without dominant masses, Taught monthly breast self examination  Self breast exams were reviewed in detail. Literature was given. Pelvic Exam:  External genitalia: normal general appearance  Urinary system: urethral meatus normal  Vaginal: normal mucosa without prolapse or lesions  Cervix: normal appearance  Adnexa: normal bimanual exam  Uterus: normal single, nontender    Rectal Exam:  exam declined by patient          Musculosk:  Normal Gait and station was noted. Digits were evaluated without abnormal findings. Range of motion, stability and strength were evaluated and found to be appropriate for the patients age. ASSESSMENT:      38 y.o. Annual   Diagnosis Orders   1. Well female exam with routine gynecological exam  PAP SMEAR      2. Special screening examination for human papillomavirus (HPV)  PAP SMEAR      3.  Encounter for screening mammogram for malignant neoplasm of breast  BULL DIGITAL SCREEN W OR WO CAD BILATERAL             Chief Complaint   Patient presents with    Annual Exam          Past Medical History:   Diagnosis Date    Abnormal Pap smear of cervix     follows with Linda    Anxiety     Asthma     physically induced    Flores's esophagus 02/05/2021    Depression     teen    Endometriosis     hx. Erosive gastritis 02/05/2021    Esophageal dysphagia     Family history of colon cancer 01/21/2021    Maternal grandfather    GERD (gastroesophageal reflux disease)     Headache 2023    recent hisotry of headaches    Hematemesis/vomiting blood     x1    History of COVID-19 2021    asymptomatic    History of kidney stones     at least 12 years ago, unable to recall if passed on own    Nausea and vomiting     every day    Rectal bleeding     Unintended weight loss          Patient Active Problem List    Diagnosis Date Noted    H/O LEEP 02/15/2022     Priority: High    H/O tubal ligation 02/15/2022     Priority: High    Hiatal hernia with GERD 01/10/2023     Priority: Medium    Non-intractable vomiting 05/27/2021    Gastroesophageal reflux disease with esophagitis without hemorrhage 05/27/2021    Flores's esophagus 02/05/2021    Erosive gastritis 02/05/2021    Hematemesis/vomiting blood      x1      Rectal bleeding     Family history of colon cancer 01/21/2021     Maternal grandfather      Weight loss 01/21/2021    Esophageal dysphagia 01/21/2021    GERD (gastroesophageal reflux disease)     Nausea and vomiting     HPV in female (OTHER) 02/04/2020 12/4/19 pap collected- negative with positive HR HPV OTHER (Negative 16,18)  2/26/2020 colposcopy with ECC performed (ECC is negative)  8/2020 collect pap-NS/Reschedule  10/6/2020 pap collected with co-test      Elevated antinuclear antibody (MICKY) level 04/06/2018    Elevated C-reactive protein (CRP) 04/06/2018    Depression 02/09/2018          Hereditary Breast, Ovarian, Colon and Uterine Cancer screening Done. Tobacco & Secondary smoke risks reviewed; instructed on cessation and avoidance      Counseling Completed:  Preventative Health Recommendations and Follow up.   The patient was informed of the recommended preventative health recommendations. 1. Annuals every year; Cytology collections per prevailing guidelines. 2. Mammograms begin every year at 37 yo if no abnormalities are found and no family history. 3. Bone density studies every 2-3 years. Begin at 73 yo. If no fracture history or osteoporosis family history. (significant). 4. Colonoscopy begin at 38 yo. Repeat every ten years if negative and no family history. 5. Calcium of 1839-4095 mg/day in split dosing  6. Vitamin D 400-800 IU/day  7. All other preventative health recommendations will be managed by the patients Primary care physician. PLAN:  No follow-ups on file. Pap smear obtained. Screening mammogram ordered. Repeat Annual every 1 year  Cervical Cytology Evaluation begins at 24years old. If Negative Cytology, Follow-up screening per current guidelines. Mammograms every 1 year. If 37 yo and last mammogram was negative. Calcium and Vitamin D dosing reviewed. Colonoscopy screening reviewed as well as onset for bone density testing. Birth control and barrier recommendations discussed. STD counseling and prevention reviewed. Gardisil counseling completed for all patients 10-37 yo. Routine health maintenance per patients PCP. Orders Placed This Encounter   Procedures    BULL DIGITAL SCREEN W OR WO CAD BILATERAL     Standing Status:   Future     Standing Expiration Date:   2/17/2024     Order Specific Question:   Reason for exam:     Answer:   screening for malignant neoplasm of breast    PAP SMEAR     Patient History:    Patient's last menstrual period was 01/23/2023. OBGYN Status: Perimenopausal  Past Surgical History:  1995: ABDOMINAL EXPLORATION SURGERY      Comment:  for endometriosis.   1/10/2023: CAPSULE ENDOSCOPY; N/A      Comment:  ESOPHAGEAL CAPSULE ENDOSCOPY performed by Denver Asencio DO at [facility]  02/05/2021: COLONOSCOPY; N/A      Comment:  COLONOSCOPY DIAGNOSTIC performed by Stefanie Curran MD at               [facility] ENDO  01/10/2023: ESOPHAGOGASTRODUODENOSCOPY; N/A      Comment:  ESOPHAGEAL CAPSULE ENDOSCOPY  No date: KNEE SURGERY; Right      Comment:  scope bursitis   No date: TUBAL LIGATION  02/05/2021: UPPER GASTROINTESTINAL ENDOSCOPY; N/A      Comment:  [facility]  1/10/2023: UPPER GASTROINTESTINAL ENDOSCOPY      Comment:  EGD BIOPSY performed by Henry Hopkins DO at [facility]  No date: WISDOM TOOTH EXTRACTION    Problem List       Edg Problems Affecting Cytology    HPV in female    Overview Addendum 10/6/2020  3:00 PM by Eavns Baron DO     12/4/19 pap collected- negative with positive HR HPV OTHER (Negative   16,18)  2/26/2020 colposcopy with ECC performed (ECC is negative)  8/2020 collect pap-NS/Reschedule  10/6/2020 pap collected with co-test      Social History    Tobacco Use      Smoking status: Never      Smokeless tobacco: Never      Tobacco comments: Few cigarettes a long time ago       Standing Status:   Future     Standing Expiration Date:   2/17/2024     Order Specific Question:   Collection Type     Answer: Thin Prep     Order Specific Question:   Prior Abnormal Pap Test     Answer:   No     Order Specific Question:   Screening or Diagnostic     Answer:   Screening     Order Specific Question:   HPV Requested? Answer:   Yes     Order Specific Question:   High Risk Patient     Answer:   N/A           The patient, Karina Swenson is a 38 y.o. female, was seen with a total time spent of 20 minutes for the visit on this date of service by the E/M provider. The time component had both face to face and non face to face time spent in determining the total time component. Counseling and education regarding her diagnosis listed below and her options regarding those diagnoses were also included in determining her time component. Diagnosis Orders   1.  Well female exam with routine gynecological exam  PAP SMEAR 2. Special screening examination for human papillomavirus (HPV)  PAP SMEAR      3. Encounter for screening mammogram for malignant neoplasm of breast  BULL DIGITAL SCREEN W OR WO CAD BILATERAL           The patient had her preventative health maintenance recommendations and follow-up reviewed with her at the completion of her visit.

## 2023-03-23 NOTE — PROGRESS NOTES
Judgment and thought content normal. Cognition and memory are normal.         PLAN:       Diagnosis Orders   1. Hiatal hernia with GERD        2. Obesity (BMI 30-39. 9)               HIATAL HERNIA WITH GERD  Reviewed EGD from 1/10/2023, BRAVO consistent with reflux. Reviewed [place] UGI from 11/15/2022, CT of the abdomen and pelvis from 11/28/2022, US of the gallbladder RUQ from 10/25/22, and gastric emptying study from 10/31/22. Recommended to complete ordered HIDA scan for nausea  Continue taking Prilosec 20 mg and Pepcid 20 mg, as prescribed  Advised the patient to avoid smoking, sleep with the head of the bed elevated, avoid laying down after meals. Advised patient to avoid foods that trigger reflux, such as foods that are spicy or high in acidity. Discussed hiatal hernia repair. She is not interested in surgery at this time. MORBID OBESITY, BMI 36  Information on medical weight loss provided today to patient at   Need for long term diet and exercise discussed to sustain weight loss    Follow up: 6 months    Scribe Attestation:  Meek Macdonald, scribed on behalf of Henry Hopkins DO. Electronically signed by Henry Hopkins DO on 3/24/2023 at 1:22 PM    I, Henry Hopkins DO DO, personally performed the services described in this documentation. All medical record entries made by the scribe were at my direction and in my presence. I have reviewed the chart and discharge instructions (if applicable) and agree that the record reflects my personal performance and is accurate and complete.     Electronically Signed: Henry Hopkins DO. 03/31/23. 2:15 PM.

## 2023-03-24 ENCOUNTER — OFFICE VISIT (OUTPATIENT)
Dept: BARIATRICS/WEIGHT MGMT | Age: 43
End: 2023-03-24
Payer: COMMERCIAL

## 2023-03-24 VITALS
HEART RATE: 80 BPM | BODY MASS INDEX: 36.44 KG/M2 | DIASTOLIC BLOOD PRESSURE: 88 MMHG | SYSTOLIC BLOOD PRESSURE: 138 MMHG | HEIGHT: 62 IN | WEIGHT: 198 LBS

## 2023-03-24 DIAGNOSIS — K21.9 HIATAL HERNIA WITH GERD: Primary | ICD-10-CM

## 2023-03-24 DIAGNOSIS — K44.9 HIATAL HERNIA WITH GERD: Primary | ICD-10-CM

## 2023-03-24 DIAGNOSIS — E66.9 OBESITY (BMI 30-39.9): ICD-10-CM

## 2023-03-24 PROCEDURE — 99213 OFFICE O/P EST LOW 20 MIN: CPT | Performed by: SURGERY

## 2023-03-24 RX ORDER — BACILLUS COAGULANS/INULIN 1B-250 MG
1 CAPSULE ORAL
COMMUNITY

## 2023-03-29 ENCOUNTER — TELEPHONE (OUTPATIENT)
Dept: BARIATRICS/WEIGHT MGMT | Age: 43
End: 2023-03-29

## 2023-03-31 ENCOUNTER — HOSPITAL ENCOUNTER (OUTPATIENT)
Age: 43
Discharge: HOME | End: 2023-03-31
Payer: COMMERCIAL

## 2023-03-31 DIAGNOSIS — M79.10 MYALGIA: ICD-10-CM

## 2023-03-31 DIAGNOSIS — R79.82 ELEVATED C-REACTIVE PROTEIN (CRP): ICD-10-CM

## 2023-03-31 DIAGNOSIS — R63.5 WEIGHT GAIN: ICD-10-CM

## 2023-03-31 LAB
CRP SERPL HS-MCNC: 7.9 MG/L (ref 0–5)
MAGNESIUM SERPL-MCNC: 1.8 MG/DL (ref 1.6–2.6)

## 2023-03-31 PROCEDURE — 36415 COLL VENOUS BLD VENIPUNCTURE: CPT

## 2023-03-31 PROCEDURE — 86140 C-REACTIVE PROTEIN: CPT

## 2023-03-31 PROCEDURE — 83735 ASSAY OF MAGNESIUM: CPT

## 2023-03-31 PROCEDURE — 86225 DNA ANTIBODY NATIVE: CPT

## 2023-03-31 PROCEDURE — 82533 TOTAL CORTISOL: CPT

## 2023-03-31 PROCEDURE — 83525 ASSAY OF INSULIN: CPT

## 2023-03-31 PROCEDURE — 86038 ANTINUCLEAR ANTIBODIES: CPT

## 2023-04-01 LAB
CORTISOL COLLECTION INFO: NORMAL
CORTISOL: 6 UG/DL (ref 2.7–18.4)
INSULIN COMMENT: NORMAL
INSULIN REFERENCE RANGE:: NORMAL
INSULIN: 100.3 MU/L

## 2023-04-02 LAB
ANA SER QL IA: NEGATIVE
DSDNA IGG SER QL IA: 1 IU/ML
NUCLEAR IGG SER IA-RTO: 0.2 U/ML

## 2023-06-01 RX ORDER — FAMOTIDINE 20 MG/1
TABLET, FILM COATED ORAL
Qty: 60 TABLET | Refills: 3 | Status: SHIPPED | OUTPATIENT
Start: 2023-06-01

## 2023-06-29 RX ORDER — OMEPRAZOLE 20 MG/1
CAPSULE, DELAYED RELEASE ORAL
Qty: 60 CAPSULE | Refills: 2 | Status: SHIPPED | OUTPATIENT
Start: 2023-06-29

## 2024-01-10 DIAGNOSIS — R11.2 INTRACTABLE NAUSEA AND VOMITING: Primary | ICD-10-CM

## 2024-01-12 RX ORDER — OMEPRAZOLE 20 MG/1
CAPSULE, DELAYED RELEASE ORAL
Qty: 60 CAPSULE | Refills: 2 | Status: SHIPPED | OUTPATIENT
Start: 2024-01-12

## 2024-02-05 ENCOUNTER — HOSPITAL ENCOUNTER (OUTPATIENT)
Dept: NUCLEAR MEDICINE | Age: 44
Discharge: HOME | End: 2024-02-07
Attending: SURGERY
Payer: COMMERCIAL

## 2024-02-05 ENCOUNTER — HOSPITAL ENCOUNTER (OUTPATIENT)
Dept: WOMENS IMAGING | Age: 44
Discharge: HOME | End: 2024-02-07
Attending: SURGERY
Payer: COMMERCIAL

## 2024-02-05 DIAGNOSIS — R11.2 INTRACTABLE NAUSEA AND VOMITING: ICD-10-CM

## 2024-02-05 DIAGNOSIS — Z12.31 ENCOUNTER FOR SCREENING MAMMOGRAM FOR MALIGNANT NEOPLASM OF BREAST: ICD-10-CM

## 2024-02-05 PROCEDURE — 77063 BREAST TOMOSYNTHESIS BI: CPT

## 2024-02-05 PROCEDURE — A9537 TC99M MEBROFENIN: HCPCS | Performed by: SURGERY

## 2024-02-05 PROCEDURE — 3430000000 HC RX DIAGNOSTIC RADIOPHARMACEUTICAL: Performed by: SURGERY

## 2024-02-05 PROCEDURE — 78227 HEPATOBIL SYST IMAGE W/DRUG: CPT

## 2024-02-05 PROCEDURE — 2580000003 HC RX 258: Performed by: SURGERY

## 2024-02-05 RX ORDER — SODIUM CHLORIDE 0.9 % (FLUSH) 0.9 %
10 SYRINGE (ML) INJECTION PRN
Status: DISCONTINUED | OUTPATIENT
Start: 2024-02-05 | End: 2024-02-08 | Stop reason: HOSPADM

## 2024-02-05 RX ADMIN — SODIUM CHLORIDE, PRESERVATIVE FREE 10 ML: 5 INJECTION INTRAVENOUS at 07:38

## 2024-02-05 RX ADMIN — Medication 5.2 MILLICURIE: at 07:38

## 2024-02-06 ENCOUNTER — HOSPITAL ENCOUNTER (OUTPATIENT)
Age: 44
Setting detail: SPECIMEN
Discharge: HOME | End: 2024-02-06

## 2024-02-06 ENCOUNTER — OFFICE VISIT (OUTPATIENT)
Dept: OBGYN CLINIC | Age: 44
End: 2024-02-06
Payer: COMMERCIAL

## 2024-02-06 VITALS
WEIGHT: 188 LBS | SYSTOLIC BLOOD PRESSURE: 122 MMHG | BODY MASS INDEX: 34.6 KG/M2 | HEIGHT: 62 IN | DIASTOLIC BLOOD PRESSURE: 78 MMHG

## 2024-02-06 DIAGNOSIS — Z11.51 SPECIAL SCREENING EXAMINATION FOR HUMAN PAPILLOMAVIRUS (HPV): ICD-10-CM

## 2024-02-06 DIAGNOSIS — Z01.419 WELL WOMAN EXAM WITH ROUTINE GYNECOLOGICAL EXAM: Primary | ICD-10-CM

## 2024-02-06 PROCEDURE — 99396 PREV VISIT EST AGE 40-64: CPT | Performed by: NURSE PRACTITIONER

## 2024-02-06 NOTE — PROGRESS NOTES
[place]          General Appearance:  This  is a well Developed, well Nourished, well groomed female.      Her BMI was reviewed. Nutritional decision making was discussed.    Skin:  There was a Normal Inspection of the skin without rashes or lesions.  There were no rashes.  (Papular, Maculopapular, Hives, Pustular, Macular)     There were no lesions (Ulcers, Erythema, Abn. Appearing Nevi)            Lymphatic:  No Lymph Nodes were Palpable in the neck , axilla or groin.  0 # Of Lymph Nodes; Location ; Character [Normal]  [Shotty] [Tender] [Enlarged]     Neck and EENT:  The neck was supple. There were no masses   The thyroid was not enlarged and had no masses.  Perrla, EOMI B/L, TMI B/L No Abnormalities.   Throat inspected-No exudates or Masses, Nares Patent No Masses        Respiratory:  The lungs were auscultated and found to be clear. There were no rales, rhonchi or wheezes. There was a good respiratory effort.    Cardiovascular:  The heart was in a regular rate and rhythm. . No S3 or S4. There was no murmur appreciated. Location, grade, and radiation are not applicable.     Extremities:  The patients extremities were without calf tenderness, edema, or varicosities.  There was full range of motion in all four extremities. Pulses in all four extremities were appreciated and are 2/4.    Abdomen:  The abdomen was soft and non-tender. There were good bowel sounds in all quadrants and there was no guarding, rebound or rigidity.  On evaluation there was no evidence of hepatosplenomegaly and there was no costal vertebral taylor tenderness bilaterally.  No hernias were appreciated.     Abdominal Scars: lap scars    Psych:  The patient had a normal Orientation to: Time, Place, Person, and Situation  There is no Mood / Affect changes    Breast:  (Chest)  normal appearance, no masses or tenderness, No nipple retraction or dimpling, No nipple discharge or bleeding, No axillary or supraclavicular adenopathy, Normal to

## 2024-02-08 ENCOUNTER — OFFICE VISIT (OUTPATIENT)
Dept: BARIATRICS/WEIGHT MGMT | Age: 44
End: 2024-02-08
Payer: COMMERCIAL

## 2024-02-08 VITALS
BODY MASS INDEX: 34.78 KG/M2 | DIASTOLIC BLOOD PRESSURE: 88 MMHG | SYSTOLIC BLOOD PRESSURE: 138 MMHG | HEART RATE: 80 BPM | HEIGHT: 62 IN | WEIGHT: 189 LBS

## 2024-02-08 DIAGNOSIS — K44.9 HIATAL HERNIA WITH GERD: Primary | ICD-10-CM

## 2024-02-08 DIAGNOSIS — K21.9 HIATAL HERNIA WITH GERD: Primary | ICD-10-CM

## 2024-02-08 LAB
HPV I/H RISK 4 DNA CVX QL NAA+PROBE: NOT DETECTED
HPV SAMPLE: NORMAL
HPV, INTERPRETATION: NORMAL
HPV16 DNA CVX QL NAA+PROBE: NOT DETECTED
HPV18 DNA CVX QL NAA+PROBE: NOT DETECTED
SPECIMEN DESCRIPTION: NORMAL

## 2024-02-08 PROCEDURE — 99214 OFFICE O/P EST MOD 30 MIN: CPT | Performed by: SURGERY

## 2024-02-08 RX ORDER — SUCRALFATE 1 G/1
1 TABLET ORAL 3 TIMES DAILY
Qty: 120 TABLET | Refills: 3 | Status: SHIPPED | OUTPATIENT
Start: 2024-02-08

## 2024-02-08 NOTE — PROGRESS NOTES
dry and intact.   Psychiatric: The patient has a normal mood and affect. Speech is normal and behavior is normal. Judgment and thought content normal. Cognition and memory are normal.     RECOMMENDATIONS:     We spent a great deal of time discussing the risks and benefits of hernia repair, including but not limited to injury to intra-abdominal organs, breakdown of the repair or recurrence, mesh infection or complication, esophageal perforation, vagus nerve injury, the need for re-operative therapy,  prolonged hospitalization,  mechanical ventilation,  and death. We discussed the possibility of bleeding, the need for blood transfusions, blood clots, hospital-acquired and intra-abdominal infection, and worsening pain or neuralgia     PLAN:       Diagnosis Orders   1. Hiatal hernia with GERD  sucralfate (CARAFATE) 1 GM tablet    Manometry Esophageal               HIATAL HERNIA WITH GERD  Reviewed EGD from 1/10/2023, BRAVO consistent with reflux.  Reviewed [place] UGI from 11/15/2022, CT of the abdomen and pelvis from 11/28/2022, US of the gallbladder RUQ from 10/25/22, gastric emptying study from 10/31/22, and HIDA scan from 2/5/2024  Continue taking Prilosec 20 mg and start taking carafate, as prescribed  Ordered manometry  Medical and surgical options discussed.  Risks of surgery vs conservative management discussed  Risks of incarceration discussed  Will further discuss surgery after manometry    Follow up: After testing    Scribe Attestation:  Abbie BOONE, scribed on behalf of Surjit Cortes DO.    Electronically signed by Surjit Cortes DO on 2/8/2024 at 11:17 AM      Surjit BOONE DO DO, personally performed the services described in this documentation. All medical record entries made by the scribe were at my direction and in my presence. I have reviewed the chart and discharge instructions (if applicable) and agree that the record reflects my personal performance and is accurate and

## 2024-02-11 LAB — CYTOLOGY REPORT: NORMAL

## 2024-03-21 ENCOUNTER — TELEPHONE (OUTPATIENT)
Dept: BARIATRICS/WEIGHT MGMT | Age: 44
End: 2024-03-21

## 2024-05-21 ENCOUNTER — HOSPITAL ENCOUNTER (OUTPATIENT)
Age: 44
Setting detail: SPECIMEN
Discharge: HOME | End: 2024-05-21

## 2024-05-21 DIAGNOSIS — E66.9 CLASS 1 OBESITY WITHOUT SERIOUS COMORBIDITY WITH BODY MASS INDEX (BMI) OF 34.0 TO 34.9 IN ADULT, UNSPECIFIED OBESITY TYPE: ICD-10-CM

## 2024-05-21 DIAGNOSIS — Z00.00 HEALTHCARE MAINTENANCE: ICD-10-CM

## 2024-05-21 DIAGNOSIS — Z11.59 NEED FOR HEPATITIS B SCREENING TEST: ICD-10-CM

## 2024-05-21 LAB
ALBUMIN SERPL-MCNC: 4.3 G/DL (ref 3.5–5.2)
ALBUMIN/GLOB SERPL: 2 {RATIO} (ref 1–2.5)
ALP SERPL-CCNC: 71 U/L (ref 35–104)
ALT SERPL-CCNC: 15 U/L (ref 10–35)
ANION GAP SERPL CALCULATED.3IONS-SCNC: 11 MMOL/L (ref 9–16)
AST SERPL-CCNC: 23 U/L (ref 10–35)
BASOPHILS # BLD: 0.05 K/UL (ref 0–0.2)
BASOPHILS NFR BLD: 1 % (ref 0–2)
BILIRUB SERPL-MCNC: <0.2 MG/DL (ref 0–1.2)
BUN SERPL-MCNC: 15 MG/DL (ref 6–20)
CALCIUM SERPL-MCNC: 9.5 MG/DL (ref 8.6–10.4)
CHLORIDE SERPL-SCNC: 106 MMOL/L (ref 98–107)
CHOLEST SERPL-MCNC: 196 MG/DL (ref 0–199)
CHOLESTEROL/HDL RATIO: 4
CO2 SERPL-SCNC: 24 MMOL/L (ref 20–31)
CREAT SERPL-MCNC: 0.7 MG/DL (ref 0.5–0.9)
EOSINOPHIL # BLD: 0.17 K/UL (ref 0–0.44)
EOSINOPHILS RELATIVE PERCENT: 2 % (ref 1–4)
ERYTHROCYTE [DISTWIDTH] IN BLOOD BY AUTOMATED COUNT: 12.7 % (ref 11.8–14.4)
GFR, ESTIMATED: >90 ML/MIN/1.73M2
GLUCOSE SERPL-MCNC: 99 MG/DL (ref 74–99)
HBV SURFACE AB SERPL IA-ACNC: <3.5 MIU/ML
HCT VFR BLD AUTO: 42.1 % (ref 36.3–47.1)
HCV AB SERPL QL IA: NONREACTIVE
HDLC SERPL-MCNC: 55 MG/DL
HGB BLD-MCNC: 13 G/DL (ref 11.9–15.1)
HIV 1+2 AB+HIV1 P24 AG SERPL QL IA: NONREACTIVE
IMM GRANULOCYTES # BLD AUTO: 0.04 K/UL (ref 0–0.3)
IMM GRANULOCYTES NFR BLD: 0 %
LDLC SERPL CALC-MCNC: 113 MG/DL (ref 0–100)
LYMPHOCYTES NFR BLD: 3.38 K/UL (ref 1.1–3.7)
LYMPHOCYTES RELATIVE PERCENT: 34 % (ref 24–43)
MCH RBC QN AUTO: 28.6 PG (ref 25.2–33.5)
MCHC RBC AUTO-ENTMCNC: 30.9 G/DL (ref 28.4–34.8)
MCV RBC AUTO: 92.5 FL (ref 82.6–102.9)
MONOCYTES NFR BLD: 0.69 K/UL (ref 0.1–1.2)
MONOCYTES NFR BLD: 7 % (ref 3–12)
NEUTROPHILS NFR BLD: 56 % (ref 36–65)
NEUTS SEG NFR BLD: 5.6 K/UL (ref 1.5–8.1)
NRBC BLD-RTO: 0 PER 100 WBC
PLATELET # BLD AUTO: 339 K/UL (ref 138–453)
PMV BLD AUTO: 10.5 FL (ref 8.1–13.5)
POTASSIUM SERPL-SCNC: 5 MMOL/L (ref 3.7–5.3)
PROT SERPL-MCNC: 7.1 G/DL (ref 6.6–8.7)
RBC # BLD AUTO: 4.55 M/UL (ref 3.95–5.11)
SODIUM SERPL-SCNC: 141 MMOL/L (ref 136–145)
TRIGL SERPL-MCNC: 137 MG/DL (ref 0–149)
VLDLC SERPL CALC-MCNC: 27 MG/DL
WBC OTHER # BLD: 9.9 K/UL (ref 3.5–11.3)

## 2024-12-12 ENCOUNTER — TELEPHONE (OUTPATIENT)
Dept: BARIATRICS/WEIGHT MGMT | Age: 44
End: 2024-12-12

## 2024-12-12 NOTE — TELEPHONE ENCOUNTER
Patient has gerd, went to [facility] ER on December 4 th, 2024 for vomiting blood and was advised to have an egd and colonoscopy. Has a hiatal hernia and jordan's esophagus please advise

## 2024-12-17 ENCOUNTER — OFFICE VISIT (OUTPATIENT)
Dept: BARIATRICS/WEIGHT MGMT | Age: 44
End: 2024-12-17
Payer: COMMERCIAL

## 2024-12-17 ENCOUNTER — PREP FOR PROCEDURE (OUTPATIENT)
Dept: BARIATRICS/WEIGHT MGMT | Age: 44
End: 2024-12-17

## 2024-12-17 VITALS
BODY MASS INDEX: 36.25 KG/M2 | HEIGHT: 62 IN | WEIGHT: 197 LBS | SYSTOLIC BLOOD PRESSURE: 126 MMHG | OXYGEN SATURATION: 99 % | DIASTOLIC BLOOD PRESSURE: 70 MMHG | RESPIRATION RATE: 16 BRPM | HEART RATE: 87 BPM

## 2024-12-17 DIAGNOSIS — E66.01 MORBID OBESITY DUE TO EXCESS CALORIES: ICD-10-CM

## 2024-12-17 DIAGNOSIS — K44.9 HIATAL HERNIA WITH GERD: Primary | ICD-10-CM

## 2024-12-17 DIAGNOSIS — K21.9 HIATAL HERNIA WITH GERD: Primary | ICD-10-CM

## 2024-12-17 DIAGNOSIS — K22.719 BARRETT'S ESOPHAGUS WITH DYSPLASIA: ICD-10-CM

## 2024-12-17 PROCEDURE — 99214 OFFICE O/P EST MOD 30 MIN: CPT | Performed by: SURGERY

## 2024-12-17 RX ORDER — AZELASTINE HYDROCHLORIDE 137 UG/1
SPRAY, METERED NASAL
COMMUNITY
Start: 2024-12-07

## 2024-12-17 RX ORDER — FLUTICASONE PROPIONATE 50 MCG
SPRAY, SUSPENSION (ML) NASAL
COMMUNITY
Start: 2024-12-07

## 2024-12-17 NOTE — PROGRESS NOTES
mouth daily (with breakfast) (Patient not taking: Reported on 12/17/2024)       No current facility-administered medications for this visit.          SOCIAL:      This patient is alone for the evaluation today.      [] HIV Risk Factors (i.e.) intravenous drug abuser; at risk sexual behavior; received blood products    [] TB Risk Factors (i.e.) Medically underserved, institutional care, foreign born, endemic area; exposure to active case    [] Hepatitis B&C Risk Factors (i.e.) Received blood transfusion prior to 1992; recreational drug use; high risk sexual behaviors; tattoos or body piercings; contact with blood or needle sticks in the workplace    Comprehension    Ability to grasp concepts and respond to questions:   [x] High   [] Medium   [] Low    Motivation    [x] Asks Questions; eager to learn   [] Needs education   [] Extreme anxiety    [] uncooperative   [] Denies need for education    English Speaking Ability    [x] Speaks English well   [x] Reads English well   [x] Understands spoken english    [x] Understands written English   [] No need for interpretive support      [] Might benefit from interpretive support   []  required for all services     REVIEW OF SYSTEMS: (Negative unless marked otherwise)     See review of Systems scanned into media    PRESENT ILLNESS:     Weight Parameters  Weight 89.4 kg (197 lb)   Height 1.575 m (5' 2.01\")   BMI Body mass index is 36.02 kg/m².   IBW     EBW               IMMUNIZATION STATUS  Immunization History   Administered Date(s) Administered    COVID-19, MODERNA BLUE border, Primary or Immunocompromised, (age 12y+), IM, 100 mcg/0.5mL 12/30/2020, 01/27/2021    TDaP, ADACEL (age 10y-64y), BOOSTRIX (age 10y+), IM, 0.5mL 02/09/2018       FALLS ASSESSMENT    [x] LOW RISK FOR FALLS    [] MODERATE RISK FOR FALLS    [] Difficulty walking/selfcare    [] Falls in the past 2 months    [] Suspicion of Clinician    [] Other:      SMOKING CESSATION    [x] Not needed     []

## 2025-01-03 ENCOUNTER — TELEPHONE (OUTPATIENT)
Dept: BARIATRICS/WEIGHT MGMT | Age: 45
End: 2025-01-03

## 2025-01-06 ENCOUNTER — ANESTHESIA EVENT (OUTPATIENT)
Dept: OPERATING ROOM | Age: 45
End: 2025-01-06
Payer: COMMERCIAL

## 2025-01-08 NOTE — PROGRESS NOTES
VM left with EGD instructions:     Date/time/location of procedure     NPO after MN status     Need for       Need to complete bowel prep/instructions per Dr. AGUIRRE phone number [phone] provided for pt to call with any further questions prior to procedure.

## 2025-01-10 ENCOUNTER — ANESTHESIA (OUTPATIENT)
Dept: OPERATING ROOM | Age: 45
End: 2025-01-10
Payer: COMMERCIAL

## 2025-01-10 ENCOUNTER — HOSPITAL ENCOUNTER (OUTPATIENT)
Age: 45
Setting detail: OUTPATIENT SURGERY
Discharge: HOME | End: 2025-01-10
Attending: SURGERY | Admitting: SURGERY
Payer: COMMERCIAL

## 2025-01-10 VITALS
OXYGEN SATURATION: 99 % | HEART RATE: 87 BPM | RESPIRATION RATE: 18 BRPM | TEMPERATURE: 97.4 F | BODY MASS INDEX: 36.44 KG/M2 | SYSTOLIC BLOOD PRESSURE: 168 MMHG | WEIGHT: 198 LBS | HEIGHT: 62 IN | DIASTOLIC BLOOD PRESSURE: 103 MMHG

## 2025-01-10 DIAGNOSIS — K21.9 GERD (GASTROESOPHAGEAL REFLUX DISEASE): ICD-10-CM

## 2025-01-10 LAB — HCG, PREGNANCY URINE (POC): NEGATIVE

## 2025-01-10 PROCEDURE — 6360000002 HC RX W HCPCS: Performed by: ANESTHESIOLOGY

## 2025-01-10 PROCEDURE — 88342 IMHCHEM/IMCYTCHM 1ST ANTB: CPT

## 2025-01-10 PROCEDURE — 81025 URINE PREGNANCY TEST: CPT

## 2025-01-10 PROCEDURE — 6360000002 HC RX W HCPCS

## 2025-01-10 PROCEDURE — 3609012400 HC EGD TRANSORAL BIOPSY SINGLE/MULTIPLE: Performed by: SURGERY

## 2025-01-10 PROCEDURE — 3700000000 HC ANESTHESIA ATTENDED CARE: Performed by: SURGERY

## 2025-01-10 PROCEDURE — 7100000010 HC PHASE II RECOVERY - FIRST 15 MIN: Performed by: SURGERY

## 2025-01-10 PROCEDURE — 2709999900 HC NON-CHARGEABLE SUPPLY: Performed by: SURGERY

## 2025-01-10 PROCEDURE — 7100000011 HC PHASE II RECOVERY - ADDTL 15 MIN: Performed by: SURGERY

## 2025-01-10 PROCEDURE — 2580000003 HC RX 258: Performed by: ANESTHESIOLOGY

## 2025-01-10 PROCEDURE — 6360000002 HC RX W HCPCS: Performed by: NURSE ANESTHETIST, CERTIFIED REGISTERED

## 2025-01-10 PROCEDURE — 88305 TISSUE EXAM BY PATHOLOGIST: CPT

## 2025-01-10 RX ORDER — NALOXONE HYDROCHLORIDE 0.4 MG/ML
INJECTION, SOLUTION INTRAMUSCULAR; INTRAVENOUS; SUBCUTANEOUS PRN
Status: DISCONTINUED | OUTPATIENT
Start: 2025-01-10 | End: 2025-01-10 | Stop reason: HOSPADM

## 2025-01-10 RX ORDER — SODIUM CHLORIDE 0.9 % (FLUSH) 0.9 %
5-40 SYRINGE (ML) INJECTION PRN
Status: DISCONTINUED | OUTPATIENT
Start: 2025-01-10 | End: 2025-01-10 | Stop reason: HOSPADM

## 2025-01-10 RX ORDER — GLYCOPYRROLATE 1 MG/5 ML
SYRINGE (ML) INTRAVENOUS
Status: DISCONTINUED | OUTPATIENT
Start: 2025-01-10 | End: 2025-01-10 | Stop reason: SDUPTHER

## 2025-01-10 RX ORDER — ISOFLURANE 1 ML/ML
LIQUID RESPIRATORY (INHALATION)
Status: DISCONTINUED
Start: 2025-01-10 | End: 2025-01-10 | Stop reason: HOSPADM

## 2025-01-10 RX ORDER — SODIUM CHLORIDE 9 MG/ML
INJECTION, SOLUTION INTRAVENOUS PRN
Status: DISCONTINUED | OUTPATIENT
Start: 2025-01-10 | End: 2025-01-10 | Stop reason: HOSPADM

## 2025-01-10 RX ORDER — MEPERIDINE HYDROCHLORIDE 50 MG/ML
12.5 INJECTION INTRAMUSCULAR; INTRAVENOUS; SUBCUTANEOUS ONCE
Status: DISCONTINUED | OUTPATIENT
Start: 2025-01-10 | End: 2025-01-10 | Stop reason: HOSPADM

## 2025-01-10 RX ORDER — PROPOFOL 10 MG/ML
INJECTION, EMULSION INTRAVENOUS
Status: DISCONTINUED | OUTPATIENT
Start: 2025-01-10 | End: 2025-01-10 | Stop reason: SDUPTHER

## 2025-01-10 RX ORDER — SODIUM CHLORIDE 0.9 % (FLUSH) 0.9 %
5-40 SYRINGE (ML) INJECTION EVERY 12 HOURS SCHEDULED
Status: DISCONTINUED | OUTPATIENT
Start: 2025-01-10 | End: 2025-01-10 | Stop reason: HOSPADM

## 2025-01-10 RX ORDER — LABETALOL HYDROCHLORIDE 5 MG/ML
INJECTION, SOLUTION INTRAVENOUS
Status: COMPLETED
Start: 2025-01-10 | End: 2025-01-10

## 2025-01-10 RX ORDER — METOCLOPRAMIDE HYDROCHLORIDE 5 MG/ML
10 INJECTION INTRAMUSCULAR; INTRAVENOUS
Status: DISCONTINUED | OUTPATIENT
Start: 2025-01-10 | End: 2025-01-10 | Stop reason: HOSPADM

## 2025-01-10 RX ORDER — HYDRALAZINE HYDROCHLORIDE 20 MG/ML
10 INJECTION INTRAMUSCULAR; INTRAVENOUS
Status: COMPLETED | OUTPATIENT
Start: 2025-01-10 | End: 2025-01-10

## 2025-01-10 RX ORDER — LIDOCAINE HYDROCHLORIDE 10 MG/ML
INJECTION, SOLUTION INFILTRATION; PERINEURAL
Status: DISCONTINUED | OUTPATIENT
Start: 2025-01-10 | End: 2025-01-10 | Stop reason: SDUPTHER

## 2025-01-10 RX ORDER — LABETALOL HYDROCHLORIDE 5 MG/ML
10 INJECTION, SOLUTION INTRAVENOUS
Status: COMPLETED | OUTPATIENT
Start: 2025-01-10 | End: 2025-01-10

## 2025-01-10 RX ORDER — OXYCODONE HYDROCHLORIDE 5 MG/1
5 TABLET ORAL PRN
Status: DISCONTINUED | OUTPATIENT
Start: 2025-01-10 | End: 2025-01-10 | Stop reason: HOSPADM

## 2025-01-10 RX ORDER — SODIUM CHLORIDE, SODIUM LACTATE, POTASSIUM CHLORIDE, CALCIUM CHLORIDE 600; 310; 30; 20 MG/100ML; MG/100ML; MG/100ML; MG/100ML
INJECTION, SOLUTION INTRAVENOUS CONTINUOUS
Status: DISCONTINUED | OUTPATIENT
Start: 2025-01-10 | End: 2025-01-10 | Stop reason: HOSPADM

## 2025-01-10 RX ORDER — MIDAZOLAM HYDROCHLORIDE 2 MG/2ML
2 INJECTION, SOLUTION INTRAMUSCULAR; INTRAVENOUS
Status: DISCONTINUED | OUTPATIENT
Start: 2025-01-10 | End: 2025-01-10 | Stop reason: HOSPADM

## 2025-01-10 RX ORDER — MORPHINE SULFATE 2 MG/ML
1 INJECTION, SOLUTION INTRAMUSCULAR; INTRAVENOUS EVERY 5 MIN PRN
Status: DISCONTINUED | OUTPATIENT
Start: 2025-01-10 | End: 2025-01-10 | Stop reason: HOSPADM

## 2025-01-10 RX ORDER — OXYCODONE HYDROCHLORIDE 5 MG/1
10 TABLET ORAL PRN
Status: DISCONTINUED | OUTPATIENT
Start: 2025-01-10 | End: 2025-01-10 | Stop reason: HOSPADM

## 2025-01-10 RX ORDER — SEVOFLURANE 250 ML/250ML
LIQUID RESPIRATORY (INHALATION)
Status: DISCONTINUED
Start: 2025-01-10 | End: 2025-01-10 | Stop reason: HOSPADM

## 2025-01-10 RX ORDER — DIPHENHYDRAMINE HYDROCHLORIDE 50 MG/ML
12.5 INJECTION INTRAMUSCULAR; INTRAVENOUS
Status: DISCONTINUED | OUTPATIENT
Start: 2025-01-10 | End: 2025-01-10 | Stop reason: HOSPADM

## 2025-01-10 RX ORDER — ONDANSETRON 2 MG/ML
4 INJECTION INTRAMUSCULAR; INTRAVENOUS
Status: DISCONTINUED | OUTPATIENT
Start: 2025-01-10 | End: 2025-01-10 | Stop reason: HOSPADM

## 2025-01-10 RX ORDER — SODIUM CHLORIDE 9 MG/ML
INJECTION, SOLUTION INTRAVENOUS CONTINUOUS
Status: DISCONTINUED | OUTPATIENT
Start: 2025-01-10 | End: 2025-01-10 | Stop reason: HOSPADM

## 2025-01-10 RX ADMIN — LIDOCAINE HYDROCHLORIDE 100 MG: 10 INJECTION, SOLUTION EPIDURAL; INFILTRATION; INTRACAUDAL; PERINEURAL at 07:38

## 2025-01-10 RX ADMIN — LABETALOL HYDROCHLORIDE 5 MG: 5 INJECTION, SOLUTION INTRAVENOUS at 08:06

## 2025-01-10 RX ADMIN — SODIUM CHLORIDE, POTASSIUM CHLORIDE, SODIUM LACTATE AND CALCIUM CHLORIDE: 600; 310; 30; 20 INJECTION, SOLUTION INTRAVENOUS at 07:01

## 2025-01-10 RX ADMIN — PROPOFOL 100 MG: 10 INJECTION, EMULSION INTRAVENOUS at 07:41

## 2025-01-10 RX ADMIN — Medication 0.2 MG: at 07:38

## 2025-01-10 RX ADMIN — LABETALOL HYDROCHLORIDE 5 MG: 5 INJECTION, SOLUTION INTRAVENOUS at 08:23

## 2025-01-10 RX ADMIN — PROPOFOL 20 MG: 10 INJECTION, EMULSION INTRAVENOUS at 07:42

## 2025-01-10 RX ADMIN — PROPOFOL 100 MG: 10 INJECTION, EMULSION INTRAVENOUS at 07:38

## 2025-01-10 ASSESSMENT — PAIN - FUNCTIONAL ASSESSMENT
PAIN_FUNCTIONAL_ASSESSMENT: 0-10
PAIN_FUNCTIONAL_ASSESSMENT: NONE - DENIES PAIN

## 2025-01-10 NOTE — OP NOTE
[facility] OR  [address]  [facility] [address]  Dept: [phone]  Loc: [phone]    Preoperative Diagnosis:    GERD  Morbid obesity, BMI of Body mass index is 36.21 kg/m².    Postoperative Diagnosis:   GERD with hiatal hernia, no esophagitis  Diffuse Gastritis  Morbid obesity, BMI of Body mass index is 36.21 kg/m².    Procedure: Esophagogastroduodenoscopy with Biopsy    Surgeon: Surjit Cortes DO    Assistant:    Anesthesia: MAC, see anesthesia records    Specimen:    1) Antrum for H. Pylori    2) Distal Esophagus    Findings:  3 cm sliding hiatal hernia  Normal duodenum  As above    EBL: NONE    Operative Narrative:   The risks and benefits were explained in detail to the patient who agreed and consented to the procedure.  The patient was taken to the endoscopic suite and placed in a lateral position.  Oxygen was administered via nasal cannula and a mouth guard was placed.  MAC was administered via the anesthetic team.      The endoscope was then advanced into the oropharynx and down into the esophagus under direct visualization. The scope was further advanced through the esophagus, GE junction and stomach to the pylorus under visualization.  The scope was passed through the pylorus and duodenal sweep performed, advancing and visualizing to the second portion of the duodenum.  The scope was then withdrawn to the antrum and cold forceps were used to take biopsies of the antrum for H. Pylori.  Appropriate hemostasis was noted.  The scope was then retroflexed to visualize the GE junction.  Evidence of a hiatal hernia was noted.  The scope was then slowly withdrawn through the GE junction.  The Z line was noted. The stomach was then decompressed.  The scope was withdrawn from the esophagus and no further lesions noted.      The scope was withdrawn.  The patient tolerated the procedure well.      PPI.    She will follow up with the [facility] for

## 2025-01-10 NOTE — H&P
Subjective     The patient is a 44 y.o. female who is here to undergo EGD for GERD and hematemesis  Body mass index is 36.21 kg/m².    Past Medical History:   Diagnosis Date    Abnormal Pap smear of cervix     follows with Linda    Anxiety     Asthma     physically induced    Salgado's esophagus 02/05/2021    Depression     teen    Endometriosis     hx.    Erosive gastritis 02/05/2021    Esophageal dysphagia     Family history of colon cancer 01/21/2021    Maternal grandfather    GERD (gastroesophageal reflux disease)     Headache 2023    recent hisotry of headaches    Hematemesis/vomiting blood     x1    History of COVID-19 2021    asymptomatic    History of kidney stones     at least 12 years ago, unable to recall if passed on own    Nausea and vomiting     every day    Rectal bleeding     Unintended weight loss    .    Review of Systems - A complete 14 point review of systems was performed.  All was negative unless otherwise documented in HPI.    Allergies:  No Known Allergies    Past Surgical History:  Past Surgical History:   Procedure Laterality Date    ABDOMINAL EXPLORATION SURGERY  1995    for endometriosis.    CAPSULE ENDOSCOPY N/A 1/10/2023    ESOPHAGEAL CAPSULE ENDOSCOPY performed by Surjit Cortes DO at [facility] OR    COLONOSCOPY N/A 02/05/2021    COLONOSCOPY DIAGNOSTIC performed by Timoteo Avila MD at [facility] ENDO    ESOPHAGOGASTRODUODENOSCOPY N/A 01/10/2023    ESOPHAGEAL CAPSULE ENDOSCOPY    KNEE SURGERY Right     scope bursitis     TUBAL LIGATION      UPPER GASTROINTESTINAL ENDOSCOPY N/A 02/05/2021    SALGADO'S    UPPER GASTROINTESTINAL ENDOSCOPY  1/10/2023    EGD BIOPSY performed by Surjit Cortes DO at [facility] OR    WISDOM TOOTH EXTRACTION         Family History:  Family History   Problem Relation Age of Onset    Arthritis Mother     Diabetes Mother     High Cholesterol Mother     Arthritis Father     Diabetes Father     High Cholesterol Father     Arthritis Sister     Arthritis Brother         PRAPARE - Transportation     Lack of Transportation (Medical): Not on file     Lack of Transportation (Non-Medical): No   Physical Activity: Not on file   Stress: Not on file   Social Connections: Not on file   Intimate Partner Violence: Not on file   Housing Stability: Unknown (5/20/2024)    Housing Stability Vital Sign     Unable to Pay for Housing in the Last Year: Not on file     Number of Places Lived in the Last Year: Not on file     Unstable Housing in the Last Year: No       Current Facility-Administered Medications   Medication Dose Route Frequency Provider Last Rate Last Admin    0.9 % sodium chloride infusion   IntraVENous Continuous Radha Quezada MD        lactated ringers infusion   IntraVENous Continuous Radha Quezada  mL/hr at 01/10/25 0706 NoRateChange at 01/10/25 0706    sodium chloride flush 0.9 % injection 5-40 mL  5-40 mL IntraVENous 2 times per day Radha Quezada MD        sodium chloride flush 0.9 % injection 5-40 mL  5-40 mL IntraVENous PRN Radha Quezada MD        0.9 % sodium chloride infusion   IntraVENous PRN Radha Quezada MD        isoflurane inhalation liquid             sevoflurane inhalation liquid             isoflurane inhalation liquid                Objective      Physical Exam  BP (!) 160/95   Pulse 80   Temp 97 °F (36.1 °C) (Infrared)   Resp 12   Ht 1.575 m (5' 2\")   Wt 89.8 kg (198 lb)   LMP 01/05/2025 Comment: urine preg negative  SpO2 98%   BMI 36.21 kg/m²    Constitutional:  Vital signs are normal. The patient appears well-developed and well-nourished.   HEENT:   Head: Normocephalic. Atraumatic  Eyes: pupils are equal and reactive.  No scleral icterus is present.  Neck: No mass and no thyromegaly present.   Cardiovascular: Normal rate, regular rhythm, S1 normal and S2 normal.    Pulmonary/Chest: Effort normal and breath sounds normal.   Abdominal: Soft. Normal appearance. There is no organomegaly. No tenderness. There is no rigidity, no rebound, no guarding and no De La Torre's sign.

## 2025-01-10 NOTE — ANESTHESIA PRE PROCEDURE
of kidney stones     at least 12 years ago, unable to recall if passed on own    Nausea and vomiting     every day    Rectal bleeding     Unintended weight loss        Past Surgical History:        Procedure Laterality Date    ABDOMINAL EXPLORATION SURGERY  1995    for endometriosis.    CAPSULE ENDOSCOPY N/A 1/10/2023    ESOPHAGEAL CAPSULE ENDOSCOPY performed by Surjit Cortes DO at [facility] OR    COLONOSCOPY N/A 02/05/2021    COLONOSCOPY DIAGNOSTIC performed by Timoteo Avila MD at [facility] ENDO    ESOPHAGOGASTRODUODENOSCOPY N/A 01/10/2023    ESOPHAGEAL CAPSULE ENDOSCOPY    KNEE SURGERY Right     scope bursitis     TUBAL LIGATION      UPPER GASTROINTESTINAL ENDOSCOPY N/A 02/05/2021    SALGADO'S    UPPER GASTROINTESTINAL ENDOSCOPY  1/10/2023    EGD BIOPSY performed by Surjit Cortes DO at [facility] OR    WISDOM TOOTH EXTRACTION         Social History:    Social History     Tobacco Use    Smoking status: Never    Smokeless tobacco: Never    Tobacco comments:     Few cigarettes a long time ago   Substance Use Topics    Alcohol use: Yes     Comment: socially                                Counseling given: Not Answered  Tobacco comments: Few cigarettes a long time ago      Vital Signs (Current): There were no vitals filed for this visit.                                           BP Readings from Last 3 Encounters:   12/17/24 126/70   08/27/24 120/86   07/17/24 112/82       NPO Status:                                                                                 BMI:   Wt Readings from Last 3 Encounters:   12/17/24 89.4 kg (197 lb)   08/27/24 83.9 kg (185 lb)   07/17/24 83.4 kg (183 lb 12.8 oz)     There is no height or weight on file to calculate BMI.    CBC:   Lab Results   Component Value Date/Time    WBC 9.9 05/21/2024 07:04 AM    RBC 4.55 05/21/2024 07:04 AM    HGB 13.0 05/21/2024 07:04 AM    HCT 42.1 05/21/2024 07:04 AM    MCV 92.5 05/21/2024 07:04 AM    RDW 12.7 05/21/2024 07:04 AM     05/21/2024 07:04 AM

## 2025-01-10 NOTE — ANESTHESIA POSTPROCEDURE EVALUATION
Department of Anesthesiology  Postprocedure Note    Patient: Rosi Pena  MRN: [id number]  YOB: 1980  Date of evaluation: 1/10/2025    Procedure Summary       Date: 01/10/25 Room / Location: [facility] PROCEDURE ROOM / [facility]    Anesthesia Start: 0735 Anesthesia Stop: 0747    Procedure: ESOPHAGOGASTRODUODENOSCOPY BIOPSY Diagnosis:       GERD (gastroesophageal reflux disease)      (GERD (gastroesophageal reflux disease) [K21.9])    Surgeons: Surjit Cortes DO Responsible Provider: Radha Quezada MD    Anesthesia Type: MAC ASA Status: 2            Anesthesia Type: No value filed.    Joshua Phase I:      Joshua Phase II: Joshua Score: 9    Anesthesia Post Evaluation    Patient location during evaluation: PACU  Patient participation: complete - patient participated  Level of consciousness: awake and alert  Airway patency: patent  Nausea & Vomiting: no nausea and no vomiting  Cardiovascular status: blood pressure returned to baseline  Respiratory status: acceptable and room air  Hydration status: euvolemic  Pain management: adequate and satisfactory to patient    No notable events documented.

## 2025-01-10 NOTE — DISCHARGE INSTRUCTIONS
POST-ENDOSCOPY INSTRUCTIONS    1. ACTIVITY   No driving, operating machinery, or making important decisions for 24 hours.    Resume normal activity after 24 hours.  You may return to work after 24 hours.    2. DIET    EGD: Resume your usual diet unless specified below.                Diet Modification: Regular    3. MEDICATIONS  (Do not consume alcohol, tranquilizers, or sleeping medications for 24 hours unless advised by your physician)                 Resume your usual medications    4. PHYSICIAN FOLLOW-UP                 Please continue with your previously scheduled appointments                 See your primary care physician as planned.      6. NORMAL CHANGES YOU MAY EXPERIENCE AFTER ENDOSCOPY:      EGD:  Sore throat, some abdominal pain and cramping.            7. CALL YOUR PHYSICIAN IF YOU EXPERIENCE ANY OF THE FOLLOWING      A.  Passing blood rectally or vomiting blood (color may be red or black)      B.  Severe abdominal pain or tenderness (that is not relieved by passing air)      C.  Fever, chills, or excessive sweating      D.  Persistent nausea or vomiting      E.  Redness or swelling at the IV site    If you have additional questions, PLEASE call your doctor or the [facility] center at [phone]

## 2025-01-14 LAB — SURGICAL PATHOLOGY REPORT: NORMAL

## 2025-01-16 ENCOUNTER — OFFICE VISIT (OUTPATIENT)
Dept: BARIATRICS/WEIGHT MGMT | Age: 45
End: 2025-01-16
Payer: COMMERCIAL

## 2025-01-16 VITALS
HEIGHT: 62 IN | BODY MASS INDEX: 36.44 KG/M2 | DIASTOLIC BLOOD PRESSURE: 84 MMHG | RESPIRATION RATE: 16 BRPM | HEART RATE: 100 BPM | SYSTOLIC BLOOD PRESSURE: 128 MMHG | OXYGEN SATURATION: 96 % | WEIGHT: 198 LBS

## 2025-01-16 DIAGNOSIS — E66.01 MORBID OBESITY DUE TO EXCESS CALORIES: ICD-10-CM

## 2025-01-16 DIAGNOSIS — K21.9 HIATAL HERNIA WITH GERD: Primary | ICD-10-CM

## 2025-01-16 DIAGNOSIS — K44.9 HIATAL HERNIA WITH GERD: Primary | ICD-10-CM

## 2025-01-16 DIAGNOSIS — K22.719 BARRETT'S ESOPHAGUS WITH DYSPLASIA: ICD-10-CM

## 2025-01-16 PROCEDURE — 99214 OFFICE O/P EST MOD 30 MIN: CPT | Performed by: SURGERY

## 2025-01-16 NOTE — PROGRESS NOTES
[facility] INVASIVE BARIATRIC SURG  [address]  [address]  [place] [id number]  Dept: [phone]    ROBOTIC AND MINIMALLY INVASIVE SURGERY  PROGRESS NOTE     Patient: Rosi Pena        Service Date: 1/16/2025     HPI:     Chief Complaint   Patient presents with    Hiatal Hernia    Gastroesophageal Reflux    Results     EGD       History: 44 y.o. female who presents for follow up of hiatal hernia with GERD. She was last seen on 12/17/2024. Prior to that appointment, she had completed a variety of testing for her GERD and hiatal hernia. She also noted a history of Barett's esophagus. At that appointment, she was started on carafate and an EGD was ordered and completed on 1/10/2025.     Today,  she continues to have reflux despite taking carafate and Prilosec. She notes that the last two days, she has had LLQ abdominal pain. Prior to this, she has not had abdominal pain. She states that with her persistent symptoms, she is ready to proceed with surgical options. Patient reports regular bowel movements. She denies nausea, fever, and chills.     History of Tre's esophagus. The patient denies a history of myocardial infarction, deep vein thrombosis, pulmonary embolism, renal failure, hepatic failure, and stroke.    Prior Imaging/Testing:  EGD done on 1/10/2025 showed \"3 cm sliding hiatal hernia, Normal duodenum, and Diffuse Gastritis.\"    She had an EGD on 1/10/2023, which showed \" Diffuse mild gastritis, Small sliding hiatal hernia, normal duodenum, normal esophagus.\"     Patient had an [place] UGI on 11/15/2022, which showed \"Small transient hiatal hernia while prone/supine and small volume reflux while supine.\"     She had a CT of the abdomen and pelvis on 11/28/2022, which showed \"No acute abnormality.\"     She had an US of the gallbladder RUQ on 10/25/22, which was normal. Her gastric emptying study from 10/31/22 was also normal.     She had a

## 2025-01-21 ENCOUNTER — HOSPITAL ENCOUNTER (OUTPATIENT)
Age: 45
Setting detail: SPECIMEN
Discharge: HOME | End: 2025-01-21

## 2025-01-21 DIAGNOSIS — I10 PRIMARY HYPERTENSION: ICD-10-CM

## 2025-01-21 DIAGNOSIS — R00.0 TACHYCARDIA: ICD-10-CM

## 2025-01-21 LAB
T4 FREE SERPL-MCNC: 1 NG/DL (ref 0.92–1.68)
TSH SERPL DL<=0.05 MIU/L-ACNC: 0.69 UIU/ML (ref 0.27–4.2)

## 2025-01-27 ENCOUNTER — TELEPHONE (OUTPATIENT)
Dept: BARIATRICS/WEIGHT MGMT | Age: 45
End: 2025-01-27

## 2025-01-27 NOTE — TELEPHONE ENCOUNTER
Patient's record has been submitted to the insurance company on 1/27/2025 for authorization to schedule surgery.     Instructions to patient: if patient calls for status on authorization to schedule surgery please instruct patient that it could take up to 30 days for an authorization. Once authorization is received the insurance specialists will contact the patient to schedule their procedure.      Program fee paid in full yes

## 2025-02-26 ENCOUNTER — HOSPITAL ENCOUNTER (OUTPATIENT)
Dept: SLEEP CENTER | Age: 45
Discharge: HOME | End: 2025-02-28
Attending: STUDENT IN AN ORGANIZED HEALTH CARE EDUCATION/TRAINING PROGRAM
Payer: COMMERCIAL

## 2025-02-26 DIAGNOSIS — G47.19 EXCESSIVE DAYTIME SLEEPINESS: ICD-10-CM

## 2025-02-26 PROCEDURE — G0399 HOME SLEEP TEST/TYPE 3 PORTA: HCPCS

## 2025-03-10 ENCOUNTER — RESULTS FOLLOW-UP (OUTPATIENT)
Dept: SLEEP CENTER | Age: 45
End: 2025-03-10

## 2025-03-19 ENCOUNTER — HOSPITAL ENCOUNTER (OUTPATIENT)
Age: 45
Setting detail: SPECIMEN
Discharge: HOME | End: 2025-03-19

## 2025-03-19 DIAGNOSIS — I10 PRIMARY HYPERTENSION: ICD-10-CM

## 2025-03-19 LAB
ANION GAP SERPL CALCULATED.3IONS-SCNC: 9 MMOL/L (ref 9–16)
BUN SERPL-MCNC: 12 MG/DL (ref 6–20)
CALCIUM SERPL-MCNC: 9.5 MG/DL (ref 8.6–10.4)
CHLORIDE SERPL-SCNC: 102 MMOL/L (ref 98–107)
CO2 SERPL-SCNC: 27 MMOL/L (ref 20–31)
CREAT SERPL-MCNC: 0.6 MG/DL (ref 0.6–0.9)
GFR, ESTIMATED: >90 ML/MIN/1.73M2
GLUCOSE SERPL-MCNC: 80 MG/DL (ref 74–99)
POTASSIUM SERPL-SCNC: 4.4 MMOL/L (ref 3.7–5.3)
SODIUM SERPL-SCNC: 138 MMOL/L (ref 136–145)

## (undated) DEVICE — FORCEPS BX L240CM WRK CHN 2.8MM STD CAP W/ NDL MIC MESH

## (undated) DEVICE — Device: Brand: DEFENDO VALVE AND CONNECTOR KIT

## (undated) DEVICE — PERRYSBURG ENDO PACK: Brand: MEDLINE INDUSTRIES, INC.

## (undated) DEVICE — BRAVO CF CAPSULE  DELIVERY DEV, 5-PK: Brand: BRAVO

## (undated) DEVICE — BITEBLOCK 54FR W/ DENT RIM BLOX

## (undated) DEVICE — SOLUTION IRRIG 1000ML STRL H2O USP PLAS POUR BTL

## (undated) DEVICE — TUBING, SUCTION, 9/32" X 20', STRAIGHT: Brand: MEDLINE INDUSTRIES, INC.

## (undated) DEVICE — DEFENDO AIR WATER SUCTION AND BIOPSY VALVE KIT FOR  OLYMPUS: Brand: DEFENDO AIR/WATER/SUCTION AND BIOPSY VALVE

## (undated) DEVICE — ADAPTER CLEANING PORPOISE CLEANING

## (undated) DEVICE — GLOVE ORANGE PI 8   MSG9080

## (undated) DEVICE — ENDO KIT W/SYRINGE: Brand: MEDLINE INDUSTRIES, INC.